# Patient Record
Sex: MALE | Race: WHITE | NOT HISPANIC OR LATINO
[De-identification: names, ages, dates, MRNs, and addresses within clinical notes are randomized per-mention and may not be internally consistent; named-entity substitution may affect disease eponyms.]

---

## 2017-03-08 ENCOUNTER — MEDICATION RENEWAL (OUTPATIENT)
Age: 47
End: 2017-03-08

## 2017-04-26 ENCOUNTER — APPOINTMENT (OUTPATIENT)
Dept: HEART AND VASCULAR | Facility: CLINIC | Age: 47
End: 2017-04-26

## 2017-04-26 VITALS
HEART RATE: 76 BPM | DIASTOLIC BLOOD PRESSURE: 92 MMHG | SYSTOLIC BLOOD PRESSURE: 140 MMHG | WEIGHT: 228 LBS | BODY MASS INDEX: 27.75 KG/M2

## 2017-06-09 ENCOUNTER — MEDICATION RENEWAL (OUTPATIENT)
Age: 47
End: 2017-06-09

## 2017-06-14 ENCOUNTER — APPOINTMENT (OUTPATIENT)
Dept: HEART AND VASCULAR | Facility: CLINIC | Age: 47
End: 2017-06-14

## 2017-06-14 VITALS
SYSTOLIC BLOOD PRESSURE: 104 MMHG | HEART RATE: 88 BPM | DIASTOLIC BLOOD PRESSURE: 60 MMHG | WEIGHT: 215 LBS | BODY MASS INDEX: 26.17 KG/M2

## 2017-06-14 DIAGNOSIS — R00.2 PALPITATIONS: ICD-10-CM

## 2017-08-02 ENCOUNTER — APPOINTMENT (OUTPATIENT)
Dept: HEART AND VASCULAR | Facility: CLINIC | Age: 47
End: 2017-08-02
Payer: COMMERCIAL

## 2017-08-02 VITALS — DIASTOLIC BLOOD PRESSURE: 92 MMHG | HEART RATE: 84 BPM | SYSTOLIC BLOOD PRESSURE: 150 MMHG

## 2017-08-02 DIAGNOSIS — F17.200 NICOTINE DEPENDENCE, UNSPECIFIED, UNCOMPLICATED: ICD-10-CM

## 2017-08-02 PROCEDURE — 99213 OFFICE O/P EST LOW 20 MIN: CPT | Mod: 25

## 2017-08-02 RX ORDER — AZITHROMYCIN 250 MG/1
250 TABLET, FILM COATED ORAL
Qty: 6 | Refills: 0 | Status: DISCONTINUED | COMMUNITY
Start: 2017-07-20

## 2017-08-02 RX ORDER — GUAIFENESIN AND CODEINE PHOSPHATE 10; 100 MG/5ML; MG/5ML
100-10 SOLUTION ORAL
Qty: 200 | Refills: 0 | Status: DISCONTINUED | COMMUNITY
Start: 2017-07-22

## 2017-10-25 ENCOUNTER — APPOINTMENT (OUTPATIENT)
Dept: HEART AND VASCULAR | Facility: CLINIC | Age: 47
End: 2017-10-25
Payer: COMMERCIAL

## 2017-10-25 VITALS
SYSTOLIC BLOOD PRESSURE: 124 MMHG | HEART RATE: 60 BPM | WEIGHT: 210 LBS | DIASTOLIC BLOOD PRESSURE: 78 MMHG | BODY MASS INDEX: 25.56 KG/M2

## 2017-10-25 DIAGNOSIS — I49.1 ATRIAL PREMATURE DEPOLARIZATION: ICD-10-CM

## 2017-10-25 PROCEDURE — 93000 ELECTROCARDIOGRAM COMPLETE: CPT

## 2017-10-25 PROCEDURE — 99214 OFFICE O/P EST MOD 30 MIN: CPT | Mod: 25

## 2017-10-25 RX ORDER — AZITHROMYCIN 500 MG/1
500 TABLET, FILM COATED ORAL
Qty: 10 | Refills: 0 | Status: DISCONTINUED | COMMUNITY
Start: 2017-07-22 | End: 2017-10-25

## 2017-10-25 RX ORDER — VALSARTAN AND HYDROCHLOROTHIAZIDE 80; 12.5 MG/1; MG/1
80-12.5 TABLET, FILM COATED ORAL
Qty: 60 | Refills: 0 | Status: DISCONTINUED | COMMUNITY
Start: 2017-06-09

## 2018-01-10 ENCOUNTER — APPOINTMENT (OUTPATIENT)
Dept: HEART AND VASCULAR | Facility: CLINIC | Age: 48
End: 2018-01-10
Payer: COMMERCIAL

## 2018-01-10 VITALS
WEIGHT: 215 LBS | SYSTOLIC BLOOD PRESSURE: 130 MMHG | BODY MASS INDEX: 26.17 KG/M2 | DIASTOLIC BLOOD PRESSURE: 82 MMHG | HEART RATE: 64 BPM

## 2018-01-10 PROCEDURE — 99214 OFFICE O/P EST MOD 30 MIN: CPT

## 2018-07-06 ENCOUNTER — MEDICATION RENEWAL (OUTPATIENT)
Age: 48
End: 2018-07-06

## 2018-07-25 ENCOUNTER — APPOINTMENT (OUTPATIENT)
Dept: HEART AND VASCULAR | Facility: CLINIC | Age: 48
End: 2018-07-25
Payer: COMMERCIAL

## 2018-07-25 VITALS — SYSTOLIC BLOOD PRESSURE: 110 MMHG | WEIGHT: 210 LBS | BODY MASS INDEX: 25.56 KG/M2 | DIASTOLIC BLOOD PRESSURE: 94 MMHG

## 2018-07-25 VITALS — HEART RATE: 92 BPM

## 2018-07-25 PROCEDURE — 99214 OFFICE O/P EST MOD 30 MIN: CPT | Mod: 25

## 2018-07-25 PROCEDURE — 93000 ELECTROCARDIOGRAM COMPLETE: CPT

## 2018-09-05 ENCOUNTER — APPOINTMENT (OUTPATIENT)
Dept: HEART AND VASCULAR | Facility: CLINIC | Age: 48
End: 2018-09-05
Payer: COMMERCIAL

## 2018-09-05 VITALS
HEART RATE: 68 BPM | WEIGHT: 215 LBS | SYSTOLIC BLOOD PRESSURE: 120 MMHG | BODY MASS INDEX: 26.17 KG/M2 | DIASTOLIC BLOOD PRESSURE: 82 MMHG

## 2018-09-05 PROCEDURE — 93000 ELECTROCARDIOGRAM COMPLETE: CPT

## 2018-09-05 PROCEDURE — 99213 OFFICE O/P EST LOW 20 MIN: CPT | Mod: 25

## 2018-09-05 RX ORDER — TELMISARTAN AND HYDROCHLOROTHIAZIDE 40; 12.5 MG/1; MG/1
40-12.5 TABLET ORAL DAILY
Qty: 30 | Refills: 2 | Status: DISCONTINUED | COMMUNITY
Start: 2018-07-25 | End: 2018-09-05

## 2018-12-19 ENCOUNTER — APPOINTMENT (OUTPATIENT)
Dept: HEART AND VASCULAR | Facility: CLINIC | Age: 48
End: 2018-12-19
Payer: COMMERCIAL

## 2018-12-19 VITALS
SYSTOLIC BLOOD PRESSURE: 132 MMHG | DIASTOLIC BLOOD PRESSURE: 90 MMHG | WEIGHT: 223 LBS | HEART RATE: 72 BPM | BODY MASS INDEX: 27.14 KG/M2

## 2018-12-19 PROCEDURE — 93000 ELECTROCARDIOGRAM COMPLETE: CPT

## 2018-12-19 PROCEDURE — 99214 OFFICE O/P EST MOD 30 MIN: CPT | Mod: 25

## 2018-12-19 NOTE — REVIEW OF SYSTEMS
[Fever] : no fever [Headache] : no headache [Chills] : no chills [Feeling Fatigued] : not feeling fatigued [Recent Weight Loss (___ Lbs)] : no recent weight loss [Hematuria] : no hematuria [Nocturia] : nocturia [Confusion] : no confusion was observed [Memory Lapses Or Loss] : no memory lapses or loss [Depression] : no depression [Anxiety] : no anxiety [Under Stress] : under stress [Suicidal] : not suicidal [Negative] : Heme/Lymph

## 2018-12-19 NOTE — PHYSICAL EXAM
[General Appearance - Well Developed] : well developed [Normal Appearance] : normal appearance [Well Groomed] : well groomed [General Appearance - Well Nourished] : well nourished [No Deformities] : no deformities [General Appearance - In No Acute Distress] : no acute distress [Normal Conjunctiva] : the conjunctiva exhibited no abnormalities [Normal Oral Mucosa] : normal oral mucosa [Normal Jugular Venous A Waves Present] : normal jugular venous A waves present [Normal Jugular Venous V Waves Present] : normal jugular venous V waves present [No Jugular Venous Opnce A Waves] : no jugular venous ponce A waves [FreeTextEntry1] : carotids w/o bruits [] : no respiratory distress [Respiration, Rhythm And Depth] : normal respiratory rhythm and effort [Exaggerated Use Of Accessory Muscles For Inspiration] : no accessory muscle use [Auscultation Breath Sounds / Voice Sounds] : lungs were clear to auscultation bilaterally [Heart Rate And Rhythm] : heart rate and rhythm were normal [Heart Sounds] : normal S1 and S2 [Murmurs] : no murmurs present [Edema] : no peripheral edema present [Bowel Sounds] : normal bowel sounds [Abdomen Soft] : soft [Abdomen Tenderness] : non-tender [Abnormal Walk] : normal gait [Nail Clubbing] : no clubbing of the fingernails [Skin Color & Pigmentation] : normal skin color and pigmentation [Oriented To Time, Place, And Person] : oriented to person, place, and time

## 2018-12-19 NOTE — DISCUSSION/SUMMARY
[FreeTextEntry1] : EKG:NSR,LAE\par stop smoking!\par bp high will increase Diovan HCT, cont Zocor for lipids\par reviewed labs done earlier this month\par LDL 87mg%

## 2018-12-19 NOTE — HISTORY OF PRESENT ILLNESS
[FreeTextEntry1] : no cp,sob,palpitations. exercising- states his bottom bp is running high. still smoking

## 2019-03-06 ENCOUNTER — APPOINTMENT (OUTPATIENT)
Dept: HEART AND VASCULAR | Facility: CLINIC | Age: 49
End: 2019-03-06
Payer: COMMERCIAL

## 2019-03-06 VITALS
WEIGHT: 225 LBS | BODY MASS INDEX: 27.39 KG/M2 | HEART RATE: 88 BPM | DIASTOLIC BLOOD PRESSURE: 86 MMHG | SYSTOLIC BLOOD PRESSURE: 128 MMHG

## 2019-03-06 PROCEDURE — 99214 OFFICE O/P EST MOD 30 MIN: CPT | Mod: 25

## 2019-03-06 PROCEDURE — 93000 ELECTROCARDIOGRAM COMPLETE: CPT

## 2019-03-06 NOTE — PHYSICAL EXAM
[General Appearance - Well Developed] : well developed [Normal Appearance] : normal appearance [Well Groomed] : well groomed [General Appearance - Well Nourished] : well nourished [No Deformities] : no deformities [General Appearance - In No Acute Distress] : no acute distress [Normal Conjunctiva] : the conjunctiva exhibited no abnormalities [Normal Oral Mucosa] : normal oral mucosa [Normal Jugular Venous A Waves Present] : normal jugular venous A waves present [Normal Jugular Venous V Waves Present] : normal jugular venous V waves present [No Jugular Venous Ponce A Waves] : no jugular venous ponce A waves [FreeTextEntry1] : carotids w/o bruits [] : no respiratory distress [Respiration, Rhythm And Depth] : normal respiratory rhythm and effort [Exaggerated Use Of Accessory Muscles For Inspiration] : no accessory muscle use [Auscultation Breath Sounds / Voice Sounds] : lungs were clear to auscultation bilaterally [Heart Rate And Rhythm] : heart rate and rhythm were normal [Heart Sounds] : normal S1 and S2 [Murmurs] : no murmurs present [Edema] : no peripheral edema present [Bowel Sounds] : normal bowel sounds [Abdomen Soft] : soft [Abdomen Tenderness] : non-tender [Abnormal Walk] : normal gait [Nail Clubbing] : no clubbing of the fingernails [Skin Color & Pigmentation] : normal skin color and pigmentation [Oriented To Time, Place, And Person] : oriented to person, place, and time

## 2019-03-06 NOTE — DISCUSSION/SUMMARY
[FreeTextEntry1] : EKG:NSR\par BP needs to be better controlled- will increase to Diovan HCT to 160/25 and check labs, continue Zocor for lipids

## 2019-03-06 NOTE — REVIEW OF SYSTEMS
[Fever] : no fever [Headache] : no headache [Chills] : no chills [Feeling Fatigued] : not feeling fatigued [Recent Weight Loss (___ Lbs)] : no recent weight loss [Hematuria] : no hematuria [Nocturia] : no nocturia [Confusion] : no confusion was observed [Memory Lapses Or Loss] : no memory lapses or loss [Depression] : no depression [Anxiety] : no anxiety [Under Stress] : under stress [Suicidal] : not suicidal [Negative] : Heme/Lymph

## 2019-03-27 ENCOUNTER — APPOINTMENT (OUTPATIENT)
Dept: HEART AND VASCULAR | Facility: CLINIC | Age: 49
End: 2019-03-27
Payer: COMMERCIAL

## 2019-03-27 VITALS — SYSTOLIC BLOOD PRESSURE: 120 MMHG | HEART RATE: 84 BPM | DIASTOLIC BLOOD PRESSURE: 70 MMHG

## 2019-03-27 PROCEDURE — 99213 OFFICE O/P EST LOW 20 MIN: CPT

## 2019-03-27 RX ORDER — HYDROCHLOROTHIAZIDE 12.5 MG/1
12.5 TABLET ORAL DAILY
Qty: 30 | Refills: 0 | Status: DISCONTINUED | COMMUNITY
Start: 2019-03-06 | End: 2019-03-27

## 2019-03-27 NOTE — PHYSICAL EXAM
[General Appearance - Well Developed] : well developed [Normal Appearance] : normal appearance [Well Groomed] : well groomed [General Appearance - Well Nourished] : well nourished [No Deformities] : no deformities [General Appearance - In No Acute Distress] : no acute distress [Normal Conjunctiva] : the conjunctiva exhibited no abnormalities [Normal Jugular Venous A Waves Present] : normal jugular venous A waves present [Normal Jugular Venous V Waves Present] : normal jugular venous V waves present [No Jugular Venous Ponce A Waves] : no jugular venous ponce A waves [FreeTextEntry1] : carotids w/o bruits [] : no respiratory distress [Respiration, Rhythm And Depth] : normal respiratory rhythm and effort [Exaggerated Use Of Accessory Muscles For Inspiration] : no accessory muscle use [Auscultation Breath Sounds / Voice Sounds] : lungs were clear to auscultation bilaterally [Heart Rate And Rhythm] : heart rate and rhythm were normal [Heart Sounds] : normal S1 and S2 [Murmurs] : no murmurs present [Edema] : no peripheral edema present [Abdomen Soft] : soft [Abnormal Walk] : normal gait [Skin Color & Pigmentation] : normal skin color and pigmentation [Oriented To Time, Place, And Person] : oriented to person, place, and time

## 2019-06-26 ENCOUNTER — APPOINTMENT (OUTPATIENT)
Dept: HEART AND VASCULAR | Facility: CLINIC | Age: 49
End: 2019-06-26
Payer: COMMERCIAL

## 2019-06-26 VITALS
WEIGHT: 215 LBS | DIASTOLIC BLOOD PRESSURE: 82 MMHG | BODY MASS INDEX: 26.17 KG/M2 | SYSTOLIC BLOOD PRESSURE: 124 MMHG | HEART RATE: 76 BPM

## 2019-06-26 PROCEDURE — 93000 ELECTROCARDIOGRAM COMPLETE: CPT

## 2019-06-26 PROCEDURE — 99214 OFFICE O/P EST MOD 30 MIN: CPT | Mod: 25

## 2019-06-26 RX ORDER — SIMVASTATIN 20 MG/1
20 TABLET, FILM COATED ORAL
Refills: 0 | Status: DISCONTINUED | COMMUNITY
End: 2018-11-26

## 2019-06-26 NOTE — DISCUSSION/SUMMARY
[FreeTextEntry1] : EKG:NSR,WNL\par needs labs for lipids.\par stop smoking!\par cont Diovan HCT for hptn\par med renewed

## 2019-06-26 NOTE — REVIEW OF SYSTEMS
[Fever] : no fever [Headache] : no headache [Chills] : no chills [Feeling Fatigued] : not feeling fatigued [Recent Weight Loss (___ Lbs)] : no recent weight loss [Hematuria] : no hematuria [Nocturia] : no nocturia [Confusion] : no confusion was observed [Memory Lapses Or Loss] : no memory lapses or loss [Depression] : no depression [Anxiety] : no anxiety [Under Stress] : not under stress [Suicidal] : not suicidal [Negative] : Heme/Lymph

## 2019-06-26 NOTE — HISTORY OF PRESENT ILLNESS
[FreeTextEntry1] : no cp,sob,palpitations\par has been off Zocor for several months\par exercising regularly\par still smoking( < 1ppd)

## 2019-11-13 ENCOUNTER — APPOINTMENT (OUTPATIENT)
Dept: HEART AND VASCULAR | Facility: CLINIC | Age: 49
End: 2019-11-13
Payer: COMMERCIAL

## 2019-11-13 VITALS
DIASTOLIC BLOOD PRESSURE: 90 MMHG | HEART RATE: 68 BPM | WEIGHT: 220 LBS | HEIGHT: 76 IN | SYSTOLIC BLOOD PRESSURE: 130 MMHG | BODY MASS INDEX: 26.79 KG/M2

## 2019-11-13 PROCEDURE — 93000 ELECTROCARDIOGRAM COMPLETE: CPT

## 2019-11-13 PROCEDURE — 99214 OFFICE O/P EST MOD 30 MIN: CPT | Mod: 25

## 2019-11-13 NOTE — PHYSICAL EXAM
[General Appearance - Well Developed] : well developed [Normal Appearance] : normal appearance [Well Groomed] : well groomed [General Appearance - Well Nourished] : well nourished [No Deformities] : no deformities [Normal Conjunctiva] : the conjunctiva exhibited no abnormalities [General Appearance - In No Acute Distress] : no acute distress [Normal Oral Mucosa] : normal oral mucosa [Normal Jugular Venous A Waves Present] : normal jugular venous A waves present [Normal Jugular Venous V Waves Present] : normal jugular venous V waves present [No Jugular Venous Ponce A Waves] : no jugular venous ponce A waves [FreeTextEntry1] : carotids w/o bruits [] : no respiratory distress [Respiration, Rhythm And Depth] : normal respiratory rhythm and effort [Exaggerated Use Of Accessory Muscles For Inspiration] : no accessory muscle use [Auscultation Breath Sounds / Voice Sounds] : lungs were clear to auscultation bilaterally [Heart Rate And Rhythm] : heart rate and rhythm were normal [Heart Sounds] : normal S1 and S2 [Edema] : no peripheral edema present [Murmurs] : no murmurs present [Bowel Sounds] : normal bowel sounds [Abdomen Tenderness] : non-tender [Abdomen Soft] : soft [Abnormal Walk] : normal gait [Nail Clubbing] : no clubbing of the fingernails [Skin Color & Pigmentation] : normal skin color and pigmentation [Oriented To Time, Place, And Person] : oriented to person, place, and time

## 2019-11-13 NOTE — DISCUSSION/SUMMARY
[FreeTextEntry1] : EKG:NSR\par BP elevated but was on OTC cold meds- f/u BP with pcp and cont diovan \par diet for lipids- stop smoking\par meds renewed

## 2019-11-13 NOTE — REVIEW OF SYSTEMS
[see HPI] : see HPI [Confusion] : no confusion was observed [Cough] : cough [Memory Lapses Or Loss] : no memory lapses or loss [Depression] : no depression [Anxiety] : no anxiety [Under Stress] : under stress [Negative] : Heme/Lymph

## 2020-02-05 ENCOUNTER — APPOINTMENT (OUTPATIENT)
Dept: HEART AND VASCULAR | Facility: CLINIC | Age: 50
End: 2020-02-05
Payer: COMMERCIAL

## 2020-02-05 VITALS
DIASTOLIC BLOOD PRESSURE: 86 MMHG | WEIGHT: 235 LBS | HEART RATE: 72 BPM | BODY MASS INDEX: 28.61 KG/M2 | SYSTOLIC BLOOD PRESSURE: 130 MMHG

## 2020-02-05 PROCEDURE — 99214 OFFICE O/P EST MOD 30 MIN: CPT | Mod: 25

## 2020-02-05 PROCEDURE — 93000 ELECTROCARDIOGRAM COMPLETE: CPT

## 2020-02-05 PROCEDURE — 93306 TTE W/DOPPLER COMPLETE: CPT

## 2020-02-05 NOTE — REVIEW OF SYSTEMS
[see HPI] : see HPI [Recent Weight Gain (___ Lbs)] : recent [unfilled] ~Ulb weight gain [Under Stress] : under stress [Negative] : Heme/Lymph [Fever] : no fever [Headache] : no headache [Chills] : no chills [Feeling Fatigued] : not feeling fatigued [Cough] : no cough [Nocturia] : no nocturia [Confusion] : no confusion was observed [Memory Lapses Or Loss] : no memory lapses or loss [Depression] : no depression [Anxiety] : no anxiety

## 2020-02-05 NOTE — PHYSICAL EXAM
[General Appearance - Well Developed] : well developed [Normal Appearance] : normal appearance [Well Groomed] : well groomed [General Appearance - Well Nourished] : well nourished [No Deformities] : no deformities [General Appearance - In No Acute Distress] : no acute distress [Normal Conjunctiva] : the conjunctiva exhibited no abnormalities [Normal Oral Mucosa] : normal oral mucosa [Normal Jugular Venous A Waves Present] : normal jugular venous A waves present [Normal Jugular Venous V Waves Present] : normal jugular venous V waves present [No Jugular Venous Ponce A Waves] : no jugular venous ponce A waves [Respiration, Rhythm And Depth] : normal respiratory rhythm and effort [] : no respiratory distress [Exaggerated Use Of Accessory Muscles For Inspiration] : no accessory muscle use [Auscultation Breath Sounds / Voice Sounds] : lungs were clear to auscultation bilaterally [Heart Rate And Rhythm] : heart rate and rhythm were normal [Murmurs] : no murmurs present [Heart Sounds] : normal S1 and S2 [Edema] : no peripheral edema present [Bowel Sounds] : normal bowel sounds [Abdomen Soft] : soft [Abdomen Tenderness] : non-tender [Nail Clubbing] : no clubbing of the fingernails [Abnormal Walk] : normal gait [Skin Color & Pigmentation] : normal skin color and pigmentation [Oriented To Time, Place, And Person] : oriented to person, place, and time [FreeTextEntry1] : carotids w/o bruits

## 2020-02-05 NOTE — DISCUSSION/SUMMARY
[FreeTextEntry1] : EKG:NSR\par ECHO:minimal AV thickening- mild AS but visually opens well, LVH\par BP elevated probably due to weight- weight loss and cont Diovan for hptn\par MED RENEWED

## 2020-05-06 ENCOUNTER — APPOINTMENT (OUTPATIENT)
Dept: HEART AND VASCULAR | Facility: CLINIC | Age: 50
End: 2020-05-06
Payer: COMMERCIAL

## 2020-05-06 VITALS
HEART RATE: 68 BPM | WEIGHT: 248 LBS | DIASTOLIC BLOOD PRESSURE: 90 MMHG | SYSTOLIC BLOOD PRESSURE: 132 MMHG | BODY MASS INDEX: 30.19 KG/M2

## 2020-05-06 DIAGNOSIS — I10 ESSENTIAL (PRIMARY) HYPERTENSION: ICD-10-CM

## 2020-05-06 PROCEDURE — 99214 OFFICE O/P EST MOD 30 MIN: CPT | Mod: 25

## 2020-05-06 PROCEDURE — 93000 ELECTROCARDIOGRAM COMPLETE: CPT

## 2020-05-06 RX ORDER — IBUPROFEN 800 MG/1
800 TABLET, FILM COATED ORAL
Refills: 0 | Status: DISCONTINUED | COMMUNITY
End: 2020-05-06

## 2020-05-06 NOTE — DISCUSSION/SUMMARY
[FreeTextEntry1] : EKG:NSR\par BP elevated/ ? NSAIDs/weight- would increase meds but wants to try weight loss/ stop NSAID and lose weight\par cont Diovan for hptn \par stop smoking- get labs- see orthopedist for sciatica\par med renewed

## 2020-05-06 NOTE — REVIEW OF SYSTEMS
[Fever] : no fever [Headache] : no headache [Recent Weight Gain (___ Lbs)] : recent [unfilled] ~Ulb weight gain [Chills] : no chills [Feeling Fatigued] : not feeling fatigued [Cough] : no cough [Nocturia] : no nocturia [see HPI] : see HPI [Joint Pain] : joint pain [Lower Leg Pain] : leg pain [Lower Back Pain] : lower back pain [Confusion] : no confusion was observed [Memory Lapses Or Loss] : no memory lapses or loss [Depression] : no depression [Anxiety] : anxiety [Under Stress] : under stress [Negative] : Heme/Lymph

## 2020-08-12 ENCOUNTER — APPOINTMENT (OUTPATIENT)
Dept: HEART AND VASCULAR | Facility: CLINIC | Age: 50
End: 2020-08-12
Payer: COMMERCIAL

## 2020-08-12 VITALS
DIASTOLIC BLOOD PRESSURE: 86 MMHG | WEIGHT: 229 LBS | BODY MASS INDEX: 27.87 KG/M2 | SYSTOLIC BLOOD PRESSURE: 150 MMHG | HEART RATE: 68 BPM

## 2020-08-12 VITALS — DIASTOLIC BLOOD PRESSURE: 84 MMHG | SYSTOLIC BLOOD PRESSURE: 142 MMHG

## 2020-08-12 PROCEDURE — 99214 OFFICE O/P EST MOD 30 MIN: CPT | Mod: 25

## 2020-08-12 PROCEDURE — 93000 ELECTROCARDIOGRAM COMPLETE: CPT

## 2020-08-12 NOTE — DISCUSSION/SUMMARY
[FreeTextEntry1] : EKG:NSR\par BP high but he states usually good-? due to lack of sleep- he will be seeing PCP in 4-6 weeks and get labs and BP checked- cont Diovan HCT for hPTN;diet for lipids\par reviewed labs done May;\par SFK=526hm%-diet for lipids\par med renewed

## 2020-08-12 NOTE — REVIEW OF SYSTEMS
[Fever] : no fever [Headache] : no headache [Recent Weight Gain (___ Lbs)] : no recent weight gain [Chills] : no chills [Feeling Fatigued] : not feeling fatigued [Cough] : no cough [Recent Weight Loss (___ Lbs)] : recent [unfilled] ~Ulb weight loss [Nocturia] : no nocturia [see HPI] : see HPI [Joint Pain] : joint pain [Lower Leg Pain] : leg pain [Lower Back Pain] : lower back pain [Confusion] : no confusion was observed [Memory Lapses Or Loss] : no memory lapses or loss [Depression] : no depression [Anxiety] : no anxiety [Under Stress] : under stress [Negative] : Heme/Lymph

## 2020-08-12 NOTE — PHYSICAL EXAM
[Normal Appearance] : normal appearance [General Appearance - Well Developed] : well developed [Well Groomed] : well groomed [No Deformities] : no deformities [General Appearance - Well Nourished] : well nourished [Normal Oral Mucosa] : normal oral mucosa [Normal Conjunctiva] : the conjunctiva exhibited no abnormalities [General Appearance - In No Acute Distress] : no acute distress [No Jugular Venous Ponce A Waves] : no jugular venous ponce A waves [Normal Jugular Venous A Waves Present] : normal jugular venous A waves present [Normal Jugular Venous V Waves Present] : normal jugular venous V waves present [FreeTextEntry1] : carotids w/o bruits [] : no respiratory distress [Respiration, Rhythm And Depth] : normal respiratory rhythm and effort [Auscultation Breath Sounds / Voice Sounds] : lungs were clear to auscultation bilaterally [Heart Rate And Rhythm] : heart rate and rhythm were normal [Exaggerated Use Of Accessory Muscles For Inspiration] : no accessory muscle use [Murmurs] : no murmurs present [Heart Sounds] : normal S1 and S2 [Edema] : no peripheral edema present [Bowel Sounds] : normal bowel sounds [Abdomen Soft] : soft [Abdomen Tenderness] : non-tender [Abnormal Walk] : normal gait [Skin Color & Pigmentation] : normal skin color and pigmentation [Nail Clubbing] : no clubbing of the fingernails [Oriented To Time, Place, And Person] : oriented to person, place, and time

## 2021-02-24 ENCOUNTER — APPOINTMENT (OUTPATIENT)
Dept: HEART AND VASCULAR | Facility: CLINIC | Age: 51
End: 2021-02-24
Payer: COMMERCIAL

## 2021-02-24 VITALS
HEART RATE: 88 BPM | DIASTOLIC BLOOD PRESSURE: 78 MMHG | WEIGHT: 225 LBS | SYSTOLIC BLOOD PRESSURE: 128 MMHG | BODY MASS INDEX: 27.39 KG/M2

## 2021-02-24 PROCEDURE — 99406 BEHAV CHNG SMOKING 3-10 MIN: CPT

## 2021-02-24 PROCEDURE — 99072 ADDL SUPL MATRL&STAF TM PHE: CPT

## 2021-02-24 PROCEDURE — 99214 OFFICE O/P EST MOD 30 MIN: CPT | Mod: 25

## 2021-02-24 PROCEDURE — 93000 ELECTROCARDIOGRAM COMPLETE: CPT

## 2021-02-24 NOTE — HISTORY OF PRESENT ILLNESS
[FreeTextEntry1] : feels well- hasn't been at [facility] because of COVIDS but exercising- no cp,sob- still smoking

## 2021-02-24 NOTE — REVIEW OF SYSTEMS
[Fever] : no fever [Headache] : no headache [Recent Weight Gain (___ Lbs)] : no recent weight gain [Chills] : no chills [Feeling Fatigued] : not feeling fatigued [Recent Weight Loss (___ Lbs)] : recent [unfilled] ~Ulb weight loss [Cough] : no cough [Nocturia] : no nocturia [Joint Pain] : no joint pain [Confusion] : no confusion was observed [Memory Lapses Or Loss] : no memory lapses or loss [Depression] : no depression [Anxiety] : no anxiety [Under Stress] : under stress [Negative] : Heme/Lymph

## 2021-02-24 NOTE — DISCUSSION/SUMMARY
[FreeTextEntry1] : EKG:NSR,ST abn\par discussed need to stop smoking- reviewed labs from may- LDL high- due for physical soon- cont Diovan for HPTN(refilled)-diet/weight loss for Lipids

## 2021-03-12 ENCOUNTER — APPOINTMENT (OUTPATIENT)
Dept: HEART AND VASCULAR | Facility: CLINIC | Age: 51
End: 2021-03-12
Payer: COMMERCIAL

## 2021-03-12 DIAGNOSIS — I51.7 CARDIOMEGALY: ICD-10-CM

## 2021-03-12 PROCEDURE — 99072 ADDL SUPL MATRL&STAF TM PHE: CPT

## 2021-03-12 PROCEDURE — 93306 TTE W/DOPPLER COMPLETE: CPT

## 2021-06-23 ENCOUNTER — APPOINTMENT (OUTPATIENT)
Dept: HEART AND VASCULAR | Facility: CLINIC | Age: 51
End: 2021-06-23
Payer: COMMERCIAL

## 2021-06-23 VITALS
SYSTOLIC BLOOD PRESSURE: 140 MMHG | WEIGHT: 219 LBS | BODY MASS INDEX: 26.66 KG/M2 | HEART RATE: 72 BPM | DIASTOLIC BLOOD PRESSURE: 88 MMHG

## 2021-06-23 PROCEDURE — 93000 ELECTROCARDIOGRAM COMPLETE: CPT

## 2021-06-23 PROCEDURE — 99215 OFFICE O/P EST HI 40 MIN: CPT | Mod: 25

## 2021-06-23 PROCEDURE — 99072 ADDL SUPL MATRL&STAF TM PHE: CPT

## 2021-06-23 PROCEDURE — 99406 BEHAV CHNG SMOKING 3-10 MIN: CPT

## 2021-06-23 RX ORDER — ASPIRIN 81 MG/1
81 TABLET ORAL
Refills: 0 | Status: DISCONTINUED | COMMUNITY
End: 2021-06-23

## 2021-06-23 NOTE — DISCUSSION/SUMMARY
[FreeTextEntry1] : EKG:NSR\par Reviewed labs done March; DVK=090ec%,IE=076xd%\par needs to stop smoking. Bp not at goal- but he hasn't been exercising and stress/lack of sleep ( BP usually good)- cont present dose of Diovan HCT, exercise and recheck BP. discussed better diet/exercise for lipids- RV 3 months for labs and if LDL still high will start statin. med renewed

## 2021-06-23 NOTE — HISTORY OF PRESENT ILLNESS
[FreeTextEntry1] : abraham lynchng weight- no cp/sob. hasn't slept past few days(MIL ). still smoking

## 2021-06-23 NOTE — REVIEW OF SYSTEMS
[Weight Loss (___ Lbs)] : [unfilled] ~Ulb weight loss [Negative] : Heme/Lymph [Hematuria] : no hematuria [Nocturia] : no nocturia

## 2021-06-23 NOTE — PHYSICAL EXAM
[Well Developed] : well developed [Well Nourished] : well nourished [No Acute Distress] : no acute distress [Normal Conjunctiva] : normal conjunctiva [Normal Venous Pressure] : normal venous pressure [No Carotid Bruit] : no carotid bruit [Normal S1, S2] : normal S1, S2 [No Rub] : no rub [No Gallop] : no gallop [Clear Lung Fields] : clear lung fields [Good Air Entry] : good air entry [No Respiratory Distress] : no respiratory distress  [Soft] : abdomen soft [Non Tender] : non-tender [Normal Bowel Sounds] : normal bowel sounds [Normal Gait] : normal gait [No Edema] : no edema [No Cyanosis] : no cyanosis [No Clubbing] : no clubbing [No Rash] : no rash [Moves all extremities] : moves all extremities [Normal Speech] : normal speech [Alert and Oriented] : alert and oriented [Normal memory] : normal memory [de-identified] : soft systolic murmur

## 2021-12-15 ENCOUNTER — RX RENEWAL (OUTPATIENT)
Age: 51
End: 2021-12-15

## 2022-01-05 ENCOUNTER — NON-APPOINTMENT (OUTPATIENT)
Age: 52
End: 2022-01-05

## 2022-01-05 ENCOUNTER — APPOINTMENT (OUTPATIENT)
Dept: HEART AND VASCULAR | Facility: CLINIC | Age: 52
End: 2022-01-05
Payer: COMMERCIAL

## 2022-01-05 VITALS
HEART RATE: 76 BPM | BODY MASS INDEX: 24.83 KG/M2 | DIASTOLIC BLOOD PRESSURE: 70 MMHG | SYSTOLIC BLOOD PRESSURE: 130 MMHG | WEIGHT: 204 LBS

## 2022-01-05 PROCEDURE — 93000 ELECTROCARDIOGRAM COMPLETE: CPT

## 2022-01-05 PROCEDURE — 99214 OFFICE O/P EST MOD 30 MIN: CPT | Mod: 25

## 2022-01-05 NOTE — DISCUSSION/SUMMARY
[FreeTextEntry1] : EKG:NSR\par reviewed last labs- LDL high- discussed diet and need for f/u labs\par cont Diovan for HPTN\par meds refilled- needs to stop smoking

## 2022-01-05 NOTE — PHYSICAL EXAM
[Well Developed] : well developed [Well Nourished] : well nourished [No Acute Distress] : no acute distress [Normal Conjunctiva] : normal conjunctiva [Normal Venous Pressure] : normal venous pressure [No Carotid Bruit] : no carotid bruit [Normal S1, S2] : normal S1, S2 [No Rub] : no rub [No Gallop] : no gallop [Clear Lung Fields] : clear lung fields [Good Air Entry] : good air entry [No Respiratory Distress] : no respiratory distress  [Soft] : abdomen soft [Non Tender] : non-tender [Normal Bowel Sounds] : normal bowel sounds [Normal Gait] : normal gait [No Edema] : no edema [No Cyanosis] : no cyanosis [No Clubbing] : no clubbing [No Rash] : no rash [Moves all extremities] : moves all extremities [Normal Speech] : normal speech [Alert and Oriented] : alert and oriented [Normal memory] : normal memory [de-identified] : soft systolic murmur

## 2022-01-05 NOTE — HISTORY OF PRESENT ILLNESS
[FreeTextEntry1] : no cp/sob\par still smoking Presents to ED for admission to treat DVT found in right leg.  Patient was seen yesterday in [facility] ED for right leg tenderness and "small bump" on calf as per request of his PMD to r/o DVT after recent trip to [place].  Patient reports history of hypercoagulable disorder which he is on eliquis daily.  After DVT study, patient signed out AMA due to inability to stay for admission last night.  He has returned this am for admission.  Denies any CP, palpitations, SOB, abd pain, numbness, tingling, weakness, fevers, chills.

## 2022-05-11 ENCOUNTER — APPOINTMENT (OUTPATIENT)
Dept: HEART AND VASCULAR | Facility: CLINIC | Age: 52
End: 2022-05-11
Payer: COMMERCIAL

## 2022-05-11 VITALS — SYSTOLIC BLOOD PRESSURE: 120 MMHG | DIASTOLIC BLOOD PRESSURE: 80 MMHG | HEART RATE: 80 BPM

## 2022-05-11 PROCEDURE — 99214 OFFICE O/P EST MOD 30 MIN: CPT | Mod: 25

## 2022-05-11 PROCEDURE — 93000 ELECTROCARDIOGRAM COMPLETE: CPT

## 2022-05-11 NOTE — DISCUSSION/SUMMARY
[FreeTextEntry1] : EKG:NSR\par reviewed labs done Mar 14\par QDA=431jj%,HDL=36mg%\par better diet and repeat labs for lipids\par cont Diovan for HPTN(refilled)

## 2022-05-11 NOTE — PHYSICAL EXAM
[Well Developed] : well developed [Well Nourished] : well nourished [No Acute Distress] : no acute distress [Normal Conjunctiva] : normal conjunctiva [Normal Venous Pressure] : normal venous pressure [No Carotid Bruit] : no carotid bruit [Normal S1, S2] : normal S1, S2 [No Rub] : no rub [No Gallop] : no gallop [Clear Lung Fields] : clear lung fields [Good Air Entry] : good air entry [No Respiratory Distress] : no respiratory distress  [Soft] : abdomen soft [Non Tender] : non-tender [Normal Bowel Sounds] : normal bowel sounds [Normal Gait] : normal gait [No Edema] : no edema [No Cyanosis] : no cyanosis [No Clubbing] : no clubbing [No Rash] : no rash [Moves all extremities] : moves all extremities [Normal Speech] : normal speech [Alert and Oriented] : alert and oriented [de-identified] : soft systolic murmur

## 2022-08-26 ENCOUNTER — APPOINTMENT (OUTPATIENT)
Dept: HEART AND VASCULAR | Facility: CLINIC | Age: 52
End: 2022-08-26

## 2022-08-26 VITALS — SYSTOLIC BLOOD PRESSURE: 120 MMHG | HEART RATE: 128 BPM | DIASTOLIC BLOOD PRESSURE: 70 MMHG

## 2022-08-26 DIAGNOSIS — I35.9 NONRHEUMATIC AORTIC VALVE DISORDER, UNSPECIFIED: ICD-10-CM

## 2022-08-26 PROCEDURE — 99214 OFFICE O/P EST MOD 30 MIN: CPT | Mod: 25

## 2022-08-26 PROCEDURE — 93000 ELECTROCARDIOGRAM COMPLETE: CPT

## 2022-08-26 PROCEDURE — 93306 TTE W/DOPPLER COMPLETE: CPT

## 2022-08-26 NOTE — DISCUSSION/SUMMARY
[FreeTextEntry1] : EKG:AF with RVR\par TTE:mild AS,LVH,normal LVEF,mild AR \par new AF - discussed this with him- add Toprol f +Xarelto( wants OD dosing) for rate control/AF\par continue valsartan for HPTN; diet for lipids\par needs to stop smoking- dsicussed not to take ASa/NSAIDs

## 2022-08-26 NOTE — PHYSICAL EXAM
[Well Developed] : well developed [Well Nourished] : well nourished [No Acute Distress] : no acute distress [Normal Conjunctiva] : normal conjunctiva [Normal Venous Pressure] : normal venous pressure [No Carotid Bruit] : no carotid bruit [No Rub] : no rub [No Gallop] : no gallop [Clear Lung Fields] : clear lung fields [Good Air Entry] : good air entry [No Respiratory Distress] : no respiratory distress  [Soft] : abdomen soft [Non Tender] : non-tender [Normal Bowel Sounds] : normal bowel sounds [Normal Gait] : normal gait [No Edema] : no edema [No Cyanosis] : no cyanosis [No Clubbing] : no clubbing [No Rash] : no rash [Moves all extremities] : moves all extremities [Normal Speech] : normal speech [Alert and Oriented] : alert and oriented [de-identified] : soft systolic murmur:irregular

## 2022-08-31 ENCOUNTER — APPOINTMENT (OUTPATIENT)
Dept: HEART AND VASCULAR | Facility: CLINIC | Age: 52
End: 2022-08-31

## 2022-08-31 VITALS — SYSTOLIC BLOOD PRESSURE: 110 MMHG | HEART RATE: 52 BPM | DIASTOLIC BLOOD PRESSURE: 70 MMHG

## 2022-08-31 PROCEDURE — 99214 OFFICE O/P EST MOD 30 MIN: CPT | Mod: 25

## 2022-08-31 PROCEDURE — 93000 ELECTROCARDIOGRAM COMPLETE: CPT

## 2022-08-31 NOTE — DISCUSSION/SUMMARY
[FreeTextEntry1] : EKG:SB\par reduce BB and cont xarelto for af- continue vaslartn for hptn\par discussed CHADS score of 1- i would favor continued anticoagulation- would get CCTA to r/o CAD\par diet for lipids- depending on calcium score may need statin

## 2022-08-31 NOTE — PHYSICAL EXAM
[Well Developed] : well developed [Well Nourished] : well nourished [No Acute Distress] : no acute distress [Normal Conjunctiva] : normal conjunctiva [Normal Venous Pressure] : normal venous pressure [No Carotid Bruit] : no carotid bruit [Normal S1, S2] : normal S1, S2 [No Rub] : no rub [No Gallop] : no gallop [Clear Lung Fields] : clear lung fields [Good Air Entry] : good air entry [No Respiratory Distress] : no respiratory distress  [Soft] : abdomen soft [Non Tender] : non-tender [Normal Gait] : normal gait [No Edema] : no edema [No Cyanosis] : no cyanosis [No Clubbing] : no clubbing [No Rash] : no rash [Moves all extremities] : moves all extremities [Normal Speech] : normal speech [Alert and Oriented] : alert and oriented [de-identified] : soft systolic murmur:

## 2022-11-30 ENCOUNTER — APPOINTMENT (OUTPATIENT)
Dept: HEART AND VASCULAR | Facility: CLINIC | Age: 52
End: 2022-11-30

## 2022-11-30 ENCOUNTER — NON-APPOINTMENT (OUTPATIENT)
Age: 52
End: 2022-11-30

## 2022-11-30 VITALS
TEMPERATURE: 98.7 F | HEART RATE: 69 BPM | OXYGEN SATURATION: 96 % | SYSTOLIC BLOOD PRESSURE: 128 MMHG | BODY MASS INDEX: 32.47 KG/M2 | WEIGHT: 253 LBS | DIASTOLIC BLOOD PRESSURE: 77 MMHG | HEIGHT: 74 IN

## 2022-11-30 PROCEDURE — 99214 OFFICE O/P EST MOD 30 MIN: CPT | Mod: 25

## 2022-11-30 PROCEDURE — 93000 ELECTROCARDIOGRAM COMPLETE: CPT

## 2022-11-30 RX ORDER — EFINACONAZOLE 100 MG/ML
10 SOLUTION TOPICAL
Qty: 4 | Refills: 0 | Status: DISCONTINUED | COMMUNITY
Start: 2022-03-26

## 2022-11-30 RX ORDER — CIPROFLOXACIN HYDROCHLORIDE 500 MG/1
500 TABLET, FILM COATED ORAL
Qty: 20 | Refills: 0 | Status: DISCONTINUED | COMMUNITY
Start: 2022-10-27

## 2022-11-30 RX ORDER — OXYCODONE AND ACETAMINOPHEN 5; 325 MG/1; MG/1
5-325 TABLET ORAL
Qty: 4 | Refills: 0 | Status: DISCONTINUED | COMMUNITY
Start: 2022-10-26

## 2022-11-30 RX ORDER — DIAZEPAM 10 MG/1
10 TABLET ORAL
Qty: 4 | Refills: 0 | Status: DISCONTINUED | COMMUNITY
Start: 2022-10-26

## 2022-11-30 RX ORDER — ONDANSETRON 8 MG/1
8 TABLET ORAL
Qty: 4 | Refills: 0 | Status: DISCONTINUED | COMMUNITY
Start: 2022-10-26

## 2022-11-30 NOTE — PHYSICAL EXAM
[Well Developed] : well developed [Well Nourished] : well nourished [No Acute Distress] : no acute distress [Normal Conjunctiva] : normal conjunctiva [Normal Venous Pressure] : normal venous pressure [No Carotid Bruit] : no carotid bruit [Normal S1, S2] : normal S1, S2 [No Rub] : no rub [No Gallop] : no gallop [Clear Lung Fields] : clear lung fields [Good Air Entry] : good air entry [No Respiratory Distress] : no respiratory distress  [Soft] : abdomen soft [Non Tender] : non-tender [Normal Gait] : normal gait [No Edema] : no edema [No Cyanosis] : no cyanosis [No Clubbing] : no clubbing [No Rash] : no rash [Moves all extremities] : moves all extremities [Normal Speech] : normal speech [Alert and Oriented] : alert and oriented [de-identified] : soft systolic murmur:

## 2022-11-30 NOTE — DISCUSSION/SUMMARY
[FreeTextEntry1] : EKG:NSR,PRWP(probably positional)\par continue toprol + Xarelto for PAF; Diovan HCT (refilled) for HPTN; diet for HLD- had recent labs -will get copy- advised to stop smoking\par RV for TTE

## 2022-12-14 ENCOUNTER — APPOINTMENT (OUTPATIENT)
Dept: CARDIOLOGY | Facility: CLINIC | Age: 52
End: 2022-12-14

## 2023-03-17 ENCOUNTER — NON-APPOINTMENT (OUTPATIENT)
Age: 53
End: 2023-03-17

## 2023-03-17 ENCOUNTER — APPOINTMENT (OUTPATIENT)
Dept: HEART AND VASCULAR | Facility: CLINIC | Age: 53
End: 2023-03-17
Payer: COMMERCIAL

## 2023-03-17 VITALS
HEIGHT: 74 IN | BODY MASS INDEX: 31.83 KG/M2 | SYSTOLIC BLOOD PRESSURE: 137 MMHG | TEMPERATURE: 98 F | WEIGHT: 248 LBS | OXYGEN SATURATION: 98 % | HEART RATE: 142 BPM | DIASTOLIC BLOOD PRESSURE: 94 MMHG

## 2023-03-17 VITALS — HEART RATE: 120 BPM

## 2023-03-17 PROCEDURE — 99214 OFFICE O/P EST MOD 30 MIN: CPT | Mod: 25

## 2023-03-17 PROCEDURE — 93000 ELECTROCARDIOGRAM COMPLETE: CPT

## 2023-03-17 NOTE — PHYSICAL EXAM
[Well Developed] : well developed [Well Nourished] : well nourished [No Acute Distress] : no acute distress [Normal Conjunctiva] : normal conjunctiva [Normal Venous Pressure] : normal venous pressure [No Carotid Bruit] : no carotid bruit [No Rub] : no rub [No Gallop] : no gallop [Clear Lung Fields] : clear lung fields [Good Air Entry] : good air entry [No Respiratory Distress] : no respiratory distress  [Soft] : abdomen soft [Non Tender] : non-tender [Normal Gait] : normal gait [No Edema] : no edema [No Cyanosis] : no cyanosis [No Clubbing] : no clubbing [No Rash] : no rash [Moves all extremities] : moves all extremities [Alert and Oriented] : alert and oriented [Normal Speech] : normal speech [de-identified] : irregular; soft systolic murmur:

## 2023-03-17 NOTE — DISCUSSION/SUMMARY
[FreeTextEntry1] : EKG:AF with RVR\par states went drinking last night after a wake- so feel has "holiday heart"- resume BB and Xarelto for AF; valsartan for HPTN\par diet for HLD\par needs to stop smoking\par RV 1 week

## 2023-03-29 ENCOUNTER — APPOINTMENT (OUTPATIENT)
Dept: CARDIOLOGY | Facility: CLINIC | Age: 53
End: 2023-03-29
Payer: COMMERCIAL

## 2023-03-29 PROCEDURE — 93306 TTE W/DOPPLER COMPLETE: CPT

## 2023-06-28 ENCOUNTER — APPOINTMENT (OUTPATIENT)
Dept: HEART AND VASCULAR | Facility: CLINIC | Age: 53
End: 2023-06-28
Payer: COMMERCIAL

## 2023-06-28 ENCOUNTER — NON-APPOINTMENT (OUTPATIENT)
Age: 53
End: 2023-06-28

## 2023-06-28 VITALS
DIASTOLIC BLOOD PRESSURE: 76 MMHG | TEMPERATURE: 97.9 F | BODY MASS INDEX: 31.08 KG/M2 | WEIGHT: 250 LBS | HEART RATE: 107 BPM | OXYGEN SATURATION: 96 % | SYSTOLIC BLOOD PRESSURE: 125 MMHG | HEIGHT: 75 IN

## 2023-06-28 DIAGNOSIS — I48.91 UNSPECIFIED ATRIAL FIBRILLATION: ICD-10-CM

## 2023-06-28 PROCEDURE — 93000 ELECTROCARDIOGRAM COMPLETE: CPT

## 2023-06-28 PROCEDURE — 99214 OFFICE O/P EST MOD 30 MIN: CPT | Mod: 25

## 2023-06-28 NOTE — HISTORY OF PRESENT ILLNESS
[FreeTextEntry1] : still smoking- at times ( when his weight is up) he feels less energetic\par HE IS NOT TAKING METOPROLOL NOR DOAC

## 2023-06-28 NOTE — PHYSICAL EXAM
[Well Developed] : well developed [Well Nourished] : well nourished [No Acute Distress] : no acute distress [Normal Conjunctiva] : normal conjunctiva [Normal Venous Pressure] : normal venous pressure [No Carotid Bruit] : no carotid bruit [No Rub] : no rub [No Gallop] : no gallop [Clear Lung Fields] : clear lung fields [Good Air Entry] : good air entry [No Respiratory Distress] : no respiratory distress  [Soft] : abdomen soft [Non Tender] : non-tender [Normal Gait] : normal gait [No Edema] : no edema [No Cyanosis] : no cyanosis [No Clubbing] : no clubbing [No Rash] : no rash [Moves all extremities] : moves all extremities [Normal Speech] : normal speech [Alert and Oriented] : alert and oriented [de-identified] : irregular; soft systolic murmur:

## 2023-06-28 NOTE — DISCUSSION/SUMMARY
[FreeTextEntry1] : EKG:AF\par enforced need for DOAC to reduce stroke risk and BB for rate control; Valsartan for HPTN - will reorder\par diet for HLD\par discussed DCC- he wants to defer for now\par meds refilled\par needs to stop smoking

## 2023-10-11 ENCOUNTER — NON-APPOINTMENT (OUTPATIENT)
Age: 53
End: 2023-10-11

## 2023-10-11 ENCOUNTER — APPOINTMENT (OUTPATIENT)
Dept: HEART AND VASCULAR | Facility: CLINIC | Age: 53
End: 2023-10-11
Payer: COMMERCIAL

## 2023-10-11 ENCOUNTER — APPOINTMENT (OUTPATIENT)
Dept: CARDIOLOGY | Facility: CLINIC | Age: 53
End: 2023-10-11
Payer: COMMERCIAL

## 2023-10-11 VITALS
TEMPERATURE: 98.2 F | BODY MASS INDEX: 30.84 KG/M2 | HEART RATE: 64 BPM | OXYGEN SATURATION: 95 % | SYSTOLIC BLOOD PRESSURE: 132 MMHG | WEIGHT: 248 LBS | HEIGHT: 75 IN | DIASTOLIC BLOOD PRESSURE: 69 MMHG

## 2023-10-11 DIAGNOSIS — R94.31 ABNORMAL ELECTROCARDIOGRAM [ECG] [EKG]: ICD-10-CM

## 2023-10-11 DIAGNOSIS — I10 ESSENTIAL (PRIMARY) HYPERTENSION: ICD-10-CM

## 2023-10-11 PROCEDURE — 93000 ELECTROCARDIOGRAM COMPLETE: CPT

## 2023-10-11 PROCEDURE — 93242 EXT ECG>48HR<7D RECORDING: CPT

## 2023-10-11 PROCEDURE — 99214 OFFICE O/P EST MOD 30 MIN: CPT | Mod: 25

## 2023-10-19 ENCOUNTER — APPOINTMENT (OUTPATIENT)
Dept: CARDIOLOGY | Facility: CLINIC | Age: 53
End: 2023-10-19
Payer: COMMERCIAL

## 2023-10-19 PROCEDURE — A9500: CPT

## 2023-10-19 PROCEDURE — 93015 CV STRESS TEST SUPVJ I&R: CPT

## 2023-10-19 PROCEDURE — 78452 HT MUSCLE IMAGE SPECT MULT: CPT

## 2023-10-20 RX ORDER — RIVAROXABAN 20 MG/1
20 TABLET, FILM COATED ORAL
Qty: 1 | Refills: 1 | Status: DISCONTINUED | COMMUNITY
Start: 2022-08-26 | End: 2023-10-20

## 2023-11-06 ENCOUNTER — APPOINTMENT (OUTPATIENT)
Dept: HEART AND VASCULAR | Facility: CLINIC | Age: 53
End: 2023-11-06
Payer: COMMERCIAL

## 2023-11-06 ENCOUNTER — NON-APPOINTMENT (OUTPATIENT)
Age: 53
End: 2023-11-06

## 2023-11-06 VITALS
HEART RATE: 68 BPM | SYSTOLIC BLOOD PRESSURE: 134 MMHG | HEIGHT: 75 IN | OXYGEN SATURATION: 95 % | BODY MASS INDEX: 30.84 KG/M2 | DIASTOLIC BLOOD PRESSURE: 72 MMHG | WEIGHT: 248 LBS | TEMPERATURE: 98.2 F

## 2023-11-06 DIAGNOSIS — R06.09 OTHER FORMS OF DYSPNEA: ICD-10-CM

## 2023-11-06 DIAGNOSIS — E78.5 HYPERLIPIDEMIA, UNSPECIFIED: ICD-10-CM

## 2023-11-06 PROCEDURE — 99214 OFFICE O/P EST MOD 30 MIN: CPT | Mod: 25

## 2023-11-06 PROCEDURE — 93000 ELECTROCARDIOGRAM COMPLETE: CPT

## 2024-01-01 NOTE — REASON FOR VISIT
Called telephone number listed in patient's demographics, left voicemail stating the appointment scheduled for Wednesday, November 27, 2024 with Dr. Sapp will need to be rescheduled, provider unavailable. Message also advise parent(s) to return my call to reschedule the appointment. Message also stated provider is not available 8:00am, 11:40am, 2:40pm, and 3:00pm for in person visits. If/when parent(s) returns my call, please schedule with PCP or available provider.     Thank you,  Caitie Dickerson, PSR  [facility]   [FreeTextEntry1] : BP f/u

## 2024-01-12 ENCOUNTER — APPOINTMENT (OUTPATIENT)
Dept: HEART AND VASCULAR | Facility: CLINIC | Age: 54
End: 2024-01-12

## 2024-01-15 ENCOUNTER — RX RENEWAL (OUTPATIENT)
Age: 54
End: 2024-01-15

## 2024-03-20 ENCOUNTER — NON-APPOINTMENT (OUTPATIENT)
Age: 54
End: 2024-03-20

## 2024-03-20 ENCOUNTER — APPOINTMENT (OUTPATIENT)
Dept: HEART AND VASCULAR | Facility: CLINIC | Age: 54
End: 2024-03-20
Payer: COMMERCIAL

## 2024-03-20 VITALS
OXYGEN SATURATION: 98 % | WEIGHT: 226 LBS | HEIGHT: 75 IN | DIASTOLIC BLOOD PRESSURE: 67 MMHG | TEMPERATURE: 97.2 F | HEART RATE: 54 BPM | BODY MASS INDEX: 28.1 KG/M2 | SYSTOLIC BLOOD PRESSURE: 123 MMHG

## 2024-03-20 DIAGNOSIS — E78.2 MIXED HYPERLIPIDEMIA: ICD-10-CM

## 2024-03-20 DIAGNOSIS — I48.0 PAROXYSMAL ATRIAL FIBRILLATION: ICD-10-CM

## 2024-03-20 DIAGNOSIS — I10 ESSENTIAL (PRIMARY) HYPERTENSION: ICD-10-CM

## 2024-03-20 DIAGNOSIS — E78.6 LIPOPROTEIN DEFICIENCY: ICD-10-CM

## 2024-03-20 PROCEDURE — 99214 OFFICE O/P EST MOD 30 MIN: CPT | Mod: 25

## 2024-03-20 PROCEDURE — 93000 ELECTROCARDIOGRAM COMPLETE: CPT

## 2024-03-20 RX ORDER — METOPROLOL SUCCINATE 50 MG/1
50 TABLET, EXTENDED RELEASE ORAL
Qty: 180 | Refills: 1 | Status: ACTIVE | COMMUNITY
Start: 2022-08-26 | End: 1900-01-01

## 2024-03-20 RX ORDER — APIXABAN 5 MG/1
5 TABLET, FILM COATED ORAL
Qty: 180 | Refills: 1 | Status: ACTIVE | COMMUNITY
Start: 2023-10-20 | End: 1900-01-01

## 2024-03-20 RX ORDER — VALSARTAN AND HYDROCHLOROTHIAZIDE 160; 25 MG/1; MG/1
160-25 TABLET, FILM COATED ORAL
Qty: 90 | Refills: 1 | Status: ACTIVE | COMMUNITY
Start: 2017-04-26 | End: 1900-01-01

## 2024-03-20 NOTE — DISCUSSION/SUMMARY
[FreeTextEntry1] : EKG;NSR continue eliquis + Toprol for paf; Valsartan for HPTN; exercise for low HDL meds refilled

## 2024-03-20 NOTE — PHYSICAL EXAM
[Well Developed] : well developed [Well Nourished] : well nourished [No Acute Distress] : no acute distress [Normal Conjunctiva] : normal conjunctiva [Normal Venous Pressure] : normal venous pressure [No Carotid Bruit] : no carotid bruit [Normal S1, S2] : normal S1, S2 [No Gallop] : no gallop [No Rub] : no rub [Good Air Entry] : good air entry [Clear Lung Fields] : clear lung fields [No Respiratory Distress] : no respiratory distress  [Soft] : abdomen soft [Non Tender] : non-tender [Normal Gait] : normal gait [No Edema] : no edema [No Cyanosis] : no cyanosis [No Clubbing] : no clubbing [No Rash] : no rash [Moves all extremities] : moves all extremities [Normal Speech] : normal speech [Alert and Oriented] : alert and oriented [de-identified] : soft systolic murmur:

## 2024-04-22 ENCOUNTER — APPOINTMENT (OUTPATIENT)
Dept: CARDIOLOGY | Facility: CLINIC | Age: 54
End: 2024-04-22
Payer: COMMERCIAL

## 2024-04-22 DIAGNOSIS — I35.1 NONRHEUMATIC AORTIC (VALVE) INSUFFICIENCY: ICD-10-CM

## 2024-04-22 PROCEDURE — 93306 TTE W/DOPPLER COMPLETE: CPT

## 2024-04-25 PROBLEM — I35.1 MILD AORTIC REGURGITATION: Status: ACTIVE | Noted: 2021-03-12

## 2024-09-17 ENCOUNTER — NON-APPOINTMENT (OUTPATIENT)
Age: 54
End: 2024-09-17

## 2024-09-18 ENCOUNTER — NON-APPOINTMENT (OUTPATIENT)
Age: 54
End: 2024-09-18

## 2024-09-18 ENCOUNTER — APPOINTMENT (OUTPATIENT)
Dept: HEART AND VASCULAR | Facility: CLINIC | Age: 54
End: 2024-09-18
Payer: COMMERCIAL

## 2024-09-18 VITALS
DIASTOLIC BLOOD PRESSURE: 64 MMHG | BODY MASS INDEX: 28.85 KG/M2 | TEMPERATURE: 98.2 F | HEART RATE: 48 BPM | SYSTOLIC BLOOD PRESSURE: 110 MMHG | OXYGEN SATURATION: 94 % | WEIGHT: 232 LBS | HEIGHT: 75 IN

## 2024-09-18 DIAGNOSIS — I10 ESSENTIAL (PRIMARY) HYPERTENSION: ICD-10-CM

## 2024-09-18 DIAGNOSIS — I48.0 PAROXYSMAL ATRIAL FIBRILLATION: ICD-10-CM

## 2024-09-18 DIAGNOSIS — R94.31 ABNORMAL ELECTROCARDIOGRAM [ECG] [EKG]: ICD-10-CM

## 2024-09-18 PROCEDURE — 99214 OFFICE O/P EST MOD 30 MIN: CPT | Mod: 25

## 2024-09-18 PROCEDURE — G2211 COMPLEX E/M VISIT ADD ON: CPT | Mod: NC

## 2024-09-18 PROCEDURE — 93000 ELECTROCARDIOGRAM COMPLETE: CPT

## 2024-09-18 PROCEDURE — 99406 BEHAV CHNG SMOKING 3-10 MIN: CPT

## 2024-09-18 NOTE — DISCUSSION/SUMMARY
[FreeTextEntry1] : EKG:NSR,PRWP needs to stop smoking- continue eliquis + toprol for PAF; valsartan for HPTN states had labs with PCP

## 2024-09-18 NOTE — PHYSICAL EXAM
[Well Developed] : well developed [Well Nourished] : well nourished [No Acute Distress] : no acute distress [Normal Conjunctiva] : normal conjunctiva [Normal Venous Pressure] : normal venous pressure [No Carotid Bruit] : no carotid bruit [Normal S1, S2] : normal S1, S2 [No Rub] : no rub [No Gallop] : no gallop [Clear Lung Fields] : clear lung fields [Good Air Entry] : good air entry [No Respiratory Distress] : no respiratory distress  [Soft] : abdomen soft [Non Tender] : non-tender [Normal Gait] : normal gait [No Edema] : no edema [No Cyanosis] : no cyanosis [No Clubbing] : no clubbing [No Rash] : no rash [Moves all extremities] : moves all extremities [Normal Speech] : normal speech [Alert and Oriented] : alert and oriented [de-identified] : soft systolic murmur:

## 2024-09-23 ENCOUNTER — APPOINTMENT (OUTPATIENT)
Dept: CARDIOLOGY | Facility: CLINIC | Age: 54
End: 2024-09-23
Payer: COMMERCIAL

## 2024-09-23 DIAGNOSIS — E78.2 MIXED HYPERLIPIDEMIA: ICD-10-CM

## 2024-09-23 PROCEDURE — 93015 CV STRESS TEST SUPVJ I&R: CPT

## 2024-09-23 PROCEDURE — 36415 COLL VENOUS BLD VENIPUNCTURE: CPT

## 2024-09-24 LAB
ALBUMIN SERPL ELPH-MCNC: 4.2 G/DL
ALP BLD-CCNC: 45 U/L
ALT SERPL-CCNC: 34 U/L
ANION GAP SERPL CALC-SCNC: 17 MMOL/L
AST SERPL-CCNC: 32 U/L
BILIRUB SERPL-MCNC: 0.7 MG/DL
BUN SERPL-MCNC: 17 MG/DL
CALCIUM SERPL-MCNC: 9.6 MG/DL
CHLORIDE SERPL-SCNC: 105 MMOL/L
CHOLEST SERPL-MCNC: 145 MG/DL
CO2 SERPL-SCNC: 20 MMOL/L
CREAT SERPL-MCNC: 0.95 MG/DL
EGFR: 94 ML/MIN/1.73M2
GLUCOSE SERPL-MCNC: 97 MG/DL
HDLC SERPL-MCNC: 34 MG/DL
LDLC SERPL CALC-MCNC: 85 MG/DL
NONHDLC SERPL-MCNC: 111 MG/DL
POTASSIUM SERPL-SCNC: 4.4 MMOL/L
PROT SERPL-MCNC: 7.1 G/DL
SODIUM SERPL-SCNC: 141 MMOL/L
TRIGL SERPL-MCNC: 147 MG/DL

## 2025-01-24 ENCOUNTER — NON-APPOINTMENT (OUTPATIENT)
Age: 55
End: 2025-01-24

## 2025-01-24 ENCOUNTER — APPOINTMENT (OUTPATIENT)
Dept: HEART AND VASCULAR | Facility: CLINIC | Age: 55
End: 2025-01-24
Payer: COMMERCIAL

## 2025-01-24 VITALS
SYSTOLIC BLOOD PRESSURE: 115 MMHG | HEART RATE: 96 BPM | OXYGEN SATURATION: 95 % | DIASTOLIC BLOOD PRESSURE: 72 MMHG | BODY MASS INDEX: 29.72 KG/M2 | HEIGHT: 75 IN | WEIGHT: 239 LBS | TEMPERATURE: 98 F

## 2025-01-24 DIAGNOSIS — R00.2 PALPITATIONS: ICD-10-CM

## 2025-01-24 DIAGNOSIS — I48.91 UNSPECIFIED ATRIAL FIBRILLATION: ICD-10-CM

## 2025-01-24 DIAGNOSIS — I10 ESSENTIAL (PRIMARY) HYPERTENSION: ICD-10-CM

## 2025-01-24 DIAGNOSIS — I48.0 PAROXYSMAL ATRIAL FIBRILLATION: ICD-10-CM

## 2025-01-24 DIAGNOSIS — E78.6 LIPOPROTEIN DEFICIENCY: ICD-10-CM

## 2025-01-24 PROCEDURE — 99214 OFFICE O/P EST MOD 30 MIN: CPT | Mod: 25

## 2025-01-24 PROCEDURE — 99406 BEHAV CHNG SMOKING 3-10 MIN: CPT

## 2025-01-24 PROCEDURE — 93000 ELECTROCARDIOGRAM COMPLETE: CPT

## 2025-02-04 ENCOUNTER — APPOINTMENT (OUTPATIENT)
Dept: ELECTROPHYSIOLOGY | Facility: CLINIC | Age: 55
End: 2025-02-04
Payer: COMMERCIAL

## 2025-02-04 ENCOUNTER — NON-APPOINTMENT (OUTPATIENT)
Age: 55
End: 2025-02-04

## 2025-02-04 VITALS
WEIGHT: 248 LBS | HEIGHT: 75 IN | HEART RATE: 53 BPM | OXYGEN SATURATION: 95 % | BODY MASS INDEX: 30.84 KG/M2 | SYSTOLIC BLOOD PRESSURE: 107 MMHG | TEMPERATURE: 97.6 F | DIASTOLIC BLOOD PRESSURE: 62 MMHG

## 2025-02-04 DIAGNOSIS — I49.1 ATRIAL PREMATURE DEPOLARIZATION: ICD-10-CM

## 2025-02-04 DIAGNOSIS — I10 ESSENTIAL (PRIMARY) HYPERTENSION: ICD-10-CM

## 2025-02-04 DIAGNOSIS — I48.0 PAROXYSMAL ATRIAL FIBRILLATION: ICD-10-CM

## 2025-02-04 DIAGNOSIS — R00.2 PALPITATIONS: ICD-10-CM

## 2025-02-04 PROCEDURE — 99204 OFFICE O/P NEW MOD 45 MIN: CPT

## 2025-02-04 PROCEDURE — G2211 COMPLEX E/M VISIT ADD ON: CPT | Mod: NC

## 2025-02-04 PROCEDURE — 93000 ELECTROCARDIOGRAM COMPLETE: CPT

## 2025-03-27 ENCOUNTER — OUTPATIENT (OUTPATIENT)
Dept: OUTPATIENT SERVICES | Facility: HOSPITAL | Age: 55
LOS: 1 days | End: 2025-03-27

## 2025-03-27 VITALS
SYSTOLIC BLOOD PRESSURE: 124 MMHG | HEART RATE: 60 BPM | TEMPERATURE: 99 F | WEIGHT: 240.08 LBS | OXYGEN SATURATION: 97 % | DIASTOLIC BLOOD PRESSURE: 79 MMHG | HEIGHT: 76 IN | RESPIRATION RATE: 16 BRPM

## 2025-03-27 DIAGNOSIS — Z98.890 OTHER SPECIFIED POSTPROCEDURAL STATES: Chronic | ICD-10-CM

## 2025-03-27 DIAGNOSIS — I48.91 UNSPECIFIED ATRIAL FIBRILLATION: ICD-10-CM

## 2025-03-27 LAB
ANION GAP SERPL CALC-SCNC: 13 MMOL/L — SIGNIFICANT CHANGE UP (ref 7–14)
BASOPHILS # BLD AUTO: 0.05 K/UL — SIGNIFICANT CHANGE UP (ref 0–0.2)
BASOPHILS NFR BLD AUTO: 0.6 % — SIGNIFICANT CHANGE UP (ref 0–2)
BLD GP AB SCN SERPL QL: NEGATIVE — SIGNIFICANT CHANGE UP
BUN SERPL-MCNC: 19 MG/DL — SIGNIFICANT CHANGE UP (ref 7–23)
CALCIUM SERPL-MCNC: 9.4 MG/DL — SIGNIFICANT CHANGE UP (ref 8.4–10.5)
CHLORIDE SERPL-SCNC: 103 MMOL/L — SIGNIFICANT CHANGE UP (ref 98–107)
CO2 SERPL-SCNC: 23 MMOL/L — SIGNIFICANT CHANGE UP (ref 22–31)
CREAT SERPL-MCNC: 0.96 MG/DL — SIGNIFICANT CHANGE UP (ref 0.5–1.3)
EGFR: 93 ML/MIN/1.73M2 — SIGNIFICANT CHANGE UP
EGFR: 93 ML/MIN/1.73M2 — SIGNIFICANT CHANGE UP
EOSINOPHIL # BLD AUTO: 0.24 K/UL — SIGNIFICANT CHANGE UP (ref 0–0.5)
EOSINOPHIL NFR BLD AUTO: 2.7 % — SIGNIFICANT CHANGE UP (ref 0–6)
GLUCOSE SERPL-MCNC: 114 MG/DL — HIGH (ref 70–99)
HCT VFR BLD CALC: 52.2 % — HIGH (ref 39–50)
HGB BLD-MCNC: 17.5 G/DL — HIGH (ref 13–17)
IMM GRANULOCYTES NFR BLD AUTO: 0.3 % — SIGNIFICANT CHANGE UP (ref 0–0.9)
LYMPHOCYTES # BLD AUTO: 2.21 K/UL — SIGNIFICANT CHANGE UP (ref 1–3.3)
LYMPHOCYTES # BLD AUTO: 24.4 % — SIGNIFICANT CHANGE UP (ref 13–44)
MCHC RBC-ENTMCNC: 31.6 PG — SIGNIFICANT CHANGE UP (ref 27–34)
MCHC RBC-ENTMCNC: 33.5 G/DL — SIGNIFICANT CHANGE UP (ref 32–36)
MCV RBC AUTO: 94.4 FL — SIGNIFICANT CHANGE UP (ref 80–100)
MONOCYTES # BLD AUTO: 1 K/UL — HIGH (ref 0–0.9)
MONOCYTES NFR BLD AUTO: 11.1 % — SIGNIFICANT CHANGE UP (ref 2–14)
NEUTROPHILS # BLD AUTO: 5.51 K/UL — SIGNIFICANT CHANGE UP (ref 1.8–7.4)
NEUTROPHILS NFR BLD AUTO: 60.9 % — SIGNIFICANT CHANGE UP (ref 43–77)
PLATELET # BLD AUTO: 214 K/UL — SIGNIFICANT CHANGE UP (ref 150–400)
POTASSIUM SERPL-MCNC: 3.8 MMOL/L — SIGNIFICANT CHANGE UP (ref 3.5–5.3)
POTASSIUM SERPL-SCNC: 3.8 MMOL/L — SIGNIFICANT CHANGE UP (ref 3.5–5.3)
RBC # BLD: 5.53 M/UL — SIGNIFICANT CHANGE UP (ref 4.2–5.8)
RBC # FLD: 13.6 % — SIGNIFICANT CHANGE UP (ref 10.3–14.5)
RH IG SCN BLD-IMP: POSITIVE — SIGNIFICANT CHANGE UP
RH IG SCN BLD-IMP: POSITIVE — SIGNIFICANT CHANGE UP
SODIUM SERPL-SCNC: 139 MMOL/L — SIGNIFICANT CHANGE UP (ref 135–145)
WBC # BLD: 9.04 K/UL — SIGNIFICANT CHANGE UP (ref 3.8–10.5)
WBC # FLD AUTO: 9.04 K/UL — SIGNIFICANT CHANGE UP (ref 3.8–10.5)

## 2025-03-27 NOTE — H&P PST ADULT - CARDIOVASCULAR COMMENTS
has PAF last episode of palpitations was "about a year ago" last episode of palpitations was "about a year ago", pt. has PAF, no c/o symptoms currently, has mild to moderate aortic regurgitation, has APC's

## 2025-03-27 NOTE — H&P PST ADULT - HISTORY OF PRESENT ILLNESS
Pt. has PMHX of PAF, HTN, CHOL, sinus arrythmia, APC's.  Pt. presents to PST with unspecified atrial fibrillation to be evaluated for AF ablation w. PFA.      Pt. is a 55 yo male that initially had atrial fibrillation several years ago and was started on Eliquis.  Pt. recently had an episode of atrial fibrillation captured on EKG from 1/24/25.

## 2025-03-27 NOTE — H&P PST ADULT - NSICDXFAMILYHX_GEN_ALL_CORE_FT
FAMILY HISTORY:  Mother  Still living? Yes, Estimated age: Age Unknown  Family history of Sjogren's disease, Age at diagnosis: Age Unknown  FH: HTN (hypertension), Age at diagnosis: Age Unknown    Grandparent  Still living? No  FH: diabetes mellitus, Age at diagnosis: Age Unknown  FH: HTN (hypertension), Age at diagnosis: Age Unknown

## 2025-03-27 NOTE — H&P PST ADULT - NSICDXPASTMEDICALHX_GEN_ALL_CORE_FT
"Chief Complaint  Sore Throat (drainage) and Earache    Subjective          Marilee Holbrook presents to [facility]  History of Present Illness      Is here today with c/o increased drainage, sore throat started yesterday  Throat feels very sore, describes as \"shrunken\"    Drainage is causing her to have a dry cough    She has had exposure by a co worker    Review of Systems   Constitutional:  Positive for fatigue. Negative for appetite change, chills, diaphoresis and fever.   HENT:  Positive for postnasal drip and sore throat.         Ears are tender with swallowing   Respiratory:  Positive for cough. Negative for shortness of breath and wheezing.         Dry cough, relates it to the drainage in her throat   Cardiovascular:  Negative for chest pain.   Gastrointestinal: Negative.    Neurological:  Negative for dizziness.        Endorses chronic headache   Psychiatric/Behavioral:  Positive for sleep disturbance.         She endorses that she has not been sleeping well, unrelated to this illness, and so she is feeling tired     Objective   Vital Signs:  /74   Pulse 93   Resp 20   Ht 157.5 cm (62.01\")   Wt 78.5 kg (173 lb)   SpO2 99%   BMI 31.63 kg/m²     BP Readings from Last 3 Encounters:   01/05/24 122/74   10/24/23 109/83   07/25/23 94/65        Wt Readings from Last 3 Encounters:   01/05/24 78.5 kg (173 lb)   10/24/23 79.4 kg (175 lb)   07/25/23 78.6 kg (173 lb 3.2 oz)              Physical Exam  Vitals reviewed.   Constitutional:       Appearance: Normal appearance.   HENT:      Right Ear: Ear canal and external ear normal. A middle ear effusion is present.      Left Ear: Ear canal and external ear normal. A middle ear effusion is present.      Ears:      Comments: Clear fluid b/l     Nose: Nose normal.      Mouth/Throat:      Mouth: Mucous membranes are moist.      Pharynx: Posterior oropharyngeal erythema present. No oropharyngeal exudate.   Cardiovascular:      Rate " Pt arrived for 3 week labs, NP visit and treatment for Lung cancer.   and Rhythm: Normal rate and regular rhythm.      Pulses: Normal pulses.      Heart sounds: Normal heart sounds.   Pulmonary:      Effort: Pulmonary effort is normal.      Breath sounds: Normal breath sounds.   Musculoskeletal:      Cervical back: Neck supple. No tenderness.   Lymphadenopathy:      Cervical: No cervical adenopathy.   Skin:     General: Skin is warm.   Neurological:      Mental Status: She is alert and oriented to person, place, and time.   Psychiatric:         Mood and Affect: Mood normal.        Result Review :                 Assessment and Plan    Diagnoses and all orders for this visit:    1. Pharyngitis, unspecified etiology (Primary)  -     POCT SARS-CoV-2 Antigen CASSIDY + Flu  -     POCT rapid strep A    Encouraged hydration, otc preps for symptom management, ibuprofen if needed for pain or fever       Follow Up   No follow-ups on file.  Patient was given instructions and counseling regarding her condition or for health maintenance advice. Please see specific information pulled into the AVS if appropriate.        PAST MEDICAL HISTORY:  HTN (hypertension)     Paroxysmal atrial fibrillation     Sleep apnea      PAST MEDICAL HISTORY:  APC (atrial premature contractions)     History of palpitations     HTN (hypertension)     Hyperlipidemia     Paroxysmal atrial fibrillation     Sleep apnea

## 2025-04-02 NOTE — ASU PATIENT PROFILE, ADULT - CAREGIVER RELATION TO PATIENT
Fatmata is a 31 year old year old  at 16w2d who presents for routine OB care.     She denies contractions/cramping, loss of fluid, vaginal bleeding. Reports fetal flutters    Other concerns: has been out of breath since pregnancy going up    Problems:   1. Supervision of other normal pregnancy, antepartum  Cell free DNA negative, declines for AFP today after counseling-would like to await anatomy scan     2. Obesity in pregnancy:  BMI > 35  And h/o macrosomia 4150 g   Early DMS done today   BPP weekly at 37 weeks   Discussed goal weight gain of 11-20 pounds. Considering nutritionist referral.   growth scan @ 32 weeks     3. History of complicated labor and delivery  H/o 3rd degree laceration with break-down/revision with urogyn. Offered primary . She understands this is an option. She is hoping to have a vaginal birth, but considering induction of labor at 39 weeks. Denies fecal incontinence. Will further discuss at 20 week visit with Dr. Arvizu. Planning follow-up with pelvic floor PT 22.     OB labs and vitals reviewed.     Precautions discussed and addressed.     Return in 4 weeks    Tia Roy MD  Obstetrician/Gynecologist          daughter

## 2025-04-02 NOTE — ASU PATIENT PROFILE, ADULT - SURGERY TIME
[facility] URGENT CARE COURSE:    Azam was seen today for knee pain.    Diagnoses and all orders for this visit:    Sprain of right knee, unspecified ligament, initial encounter  -     KO ELASTIC W/JOINTS  -     CRUTCHES, UNDERARM, NOT WOOD    Acute pain of right knee  -     XR KNEE 3 VW RIGHT; Future               Plan:  Recommend plenty of fluids, hydration orally. . Rest. Avoid undue exertion. Watch for any increasing/worsening symptoms.  Recommendation to follow with the primary care physician in next 2-7 days. If the symptoms continues or  worsen contact primary care physician or  recommend patient go to the emergency room.     Dr. Sierra Wagner M.D.  [facility]  [address] [address] [place] [address]  Telephone:  [phone]        
07:30

## 2025-04-02 NOTE — ASU PATIENT PROFILE, ADULT - FALL HARM RISK - UNIVERSAL INTERVENTIONS
Bed in lowest position, wheels locked, appropriate side rails in place/Call bell, personal items and telephone in reach/Instruct patient to call for assistance before getting out of bed or chair/Non-slip footwear when patient is out of bed/Julian to call system/Physically safe environment - no spills, clutter or unnecessary equipment/Purposeful Proactive Rounding/Room/bathroom lighting operational, light cord in reach

## 2025-04-02 NOTE — ASU PATIENT PROFILE, ADULT - PREOP PAIN SCORE
[facility] Infectious Disease Progress Note    SUBJECTIVE  Afebrile HDS   S/p prbc transfusion   No acutee overnight events   Feels improved since admission     MEDICATIONS  Current Facility-Administered Medications   Medication Dose Route Frequency Provider Last Rate Last Admin   • mupirocin (BACTROBAN) 2 % ointment 1 application  1 application Each Nare 2 times per day Yohana Fraire MD   1 application at 11/25/21 0842   • vancomycin 1,500 mg in sodium chloride 0.9% 250 mL IVPB  1,500 mg Intravenous Once Higinio Tran MD       • sodium chloride 0.9% infusion   Intravenous Continuous PRN Madhu Rivera MD       • sodium chloride 0.9 % flush bag 25 mL  25 mL Intravenous PRN Yohana Fraire MD       • sodium chloride (PF) 0.9 % injection 2 mL  2 mL Intracatheter 2 times per day Yohana Fraire MD   2 mL at 11/25/21 0825   • ondansetron (ZOFRAN) injection 4 mg  4 mg Intravenous Q8H PRN Yohana Fraire MD       • acetaminophen (TYLENOL) tablet 650 mg  650 mg Oral Q4H PRN Yohana Fraire MD   650 mg at 11/25/21 0842   • polyethylene glycol (MIRALAX) packet 17 g  17 g Oral Daily PRN Yohana Fraire MD       • aluminum-magnesium hydroxide-simethicone (MAALOX) 200-200-20 MG/5ML suspension 30 mL  30 mL Oral 4x Daily PRN Yohana Fraire MD       • sodium chloride 0.9 % flush bag 25 mL  25 mL Intravenous PRN Yohana Fraire MD       • sodium chloride (NORMAL SALINE) 0.9 % bolus 500 mL  500 mL Intravenous PRN Yohana Fraire MD       • HYDROcodone-acetaminophen (NORCO) 5-325 MG per tablet 1 tablet  1 tablet Oral Q4H PRN Yohana Fraire MD   1 tablet at 11/25/21 0601   • sodium chloride (NORMAL SALINE) 0.9 % bolus 100-200 mL  100-200 mL Intravenous PRN Lorene Martin MD       • [START ON 11/26/2021] heparin (porcine) injection 1,000 Units  1,000 Units Intracatheter PRN Lorene Martin MD       • [START ON 11/26/2021] sodium chloride (NORMAL SALINE) 0.9 % bolus 100-200 mL  100-200 mL Intravenous PRN Lorene Martin MD       • allopurinol (ZYLOPRIM) tablet 100 mg  100  mg Oral BID Madhu Rivera MD   100 mg at 11/25/21 0825   • [Held by provider] amLODIPine (NORVASC) tablet 10 mg  10 mg Oral Daily Madhu Rivera MD       • atorvastatin (LIPITOR) tablet 40 mg  40 mg Oral Daily Madhu Rivera MD   40 mg at 11/25/21 0825   • cinacalcet (SENSIPAR) tablet 30 mg  30 mg Oral Daily Madhu Rivera MD   30 mg at 11/25/21 0825   • folic acid (FOLATE) tablet 1 mg  1 mg Oral Daily Madhu Rivera MD   1 mg at 11/25/21 0825   • [Held by provider] hydrALAZINE (APRESOLINE) tablet 50 mg  50 mg Oral TID Madhu Rivera MD       • levothyroxine (SYNTHROID, LEVOTHROID) tablet 225 mcg  225 mcg Oral QAM AC Madhu Rivera MD   225 mcg at 11/25/21 0825   • metoPROLOL succinate (TOPROL-XL) ER tablet 50 mg  50 mg Oral Nightly Madhu Rivera MD   50 mg at 11/24/21 2148   • sevelamer carbonate (RENVELA) tablet 2,400 mg  2,400 mg Oral TID WC Madhu Rivera MD   2,400 mg at 11/25/21 0824   • venlafaxine XR (EFFEXOR XR) 24 hr capsule 75 mg  75 mg Oral Daily Madhu Rivera MD   75 mg at 11/25/21 0825   • zolpidem (AMBIEN) tablet 5 mg  5 mg Oral Nightly PRN Madhu Rivera MD       • ceFAZolin (ANCEF) syringe 2,000 mg  2,000 mg Intravenous QHS Higinio Tran MD   2,000 mg at 11/24/21 2147       Allergy:  ALLERGIES:  No Known Allergies    PHYSICAL EXAM  Temp:  [96.8 °F (36 °C)-98.8 °F (37.1 °C)] 98 °F (36.7 °C)  Heart Rate:  [74-99] 78  Resp:  [15-27] 24  BP: (102-153)/() 149/83    Physical Exam   Constitutional:       Appearance: Normal appearance. She is obese.   HENT:      Head: Normocephalic.      Right Ear: Tympanic membrane normal.      Left Ear: Tympanic membrane normal.      Nose: Nose normal.      Mouth/Throat:      Mouth: Mucous membranes are moist.   Eyes:      Pupils: Pupils are equal, round, and reactive to light.   Cardiovascular:      Rate and Rhythm: Normal rate and regular rhythm.      Pulses: Normal pulses.      Heart sounds: Normal heart sounds.   Abdominal:      General: Abdomen is flat.  Bowel sounds are normal.      Palpations: Abdomen is soft.   Genitourinary:     General: Normal vulva.      Rectum: Normal.   Musculoskeletal:         General: Normal range of motion.      Cervical back: Normal range of motion and neck supple.   Skin:     General: Skin is warm and dry.      Capillary Refill: Capillary refill takes less than 2 seconds.      Comments: Extensive bruising, and this patient who is obese, involving the right thigh, right groin right gluteal region  Changes of chronic venous stasis dermatitis, mild erythema   Neurological:      General: No focal deficit present.      Mental Status: She is alert and oriented to person, place, and time.   Psychiatric:         Mood and Affect: Mood normal.         Behavior: Behavior normal.   LABORATORY  Recent Results (from the past 24 hour(s))   Hepatitis B Surface Antigen    Collection Time: 11/24/21 10:52 AM   Result Value Ref Range    Hepatitis B Surface Antigen Negative Negative   Hemoglobin and Hematocrit    Collection Time: 11/24/21 10:56 AM   Result Value Ref Range    HGB 7.9 (L) 12.0 - 15.5 g/dL    HCT 25.0 (L) 36.0 - 46.5 %   Prepare Fresh Frozen Plasma: 2 Units    Collection Time: 11/24/21 11:20 AM   Result Value Ref Range    UNIT BLOOD TYPE A Pos     ISBT BLOOD TYPE 6200     BLOOD EXPIRATION DATE 77355170005251     UNIT NUMBER [id number]     DISPENSE STATUS Transfused     PRODUCT ID Fresh Frozen Plasma     PRODUCT CODE [id number]     PRODUCT DESCRIPTION FFP CPD     UNIT BLOOD TYPE A Pos     ISBT BLOOD TYPE 6200     BLOOD EXPIRATION DATE 97270922430308     UNIT NUMBER [id number]     DISPENSE STATUS Transfused     PRODUCT ID Fresh Frozen Plasma     PRODUCT CODE [id number]     PRODUCT DESCRIPTION FFP ACD-A     ISSUE DATE/TIME 37498617545098     ISSUE DATE/TIME [id number]    Hemoglobin and Hematocrit    Collection Time: 11/24/21  6:33 PM   Result Value Ref Range    HGB 6.6 (LL) 12.0 - 15.5 g/dL    HCT 21.0 (L) 36.0 - 46.5 %   GLUCOSE, BEDSIDE -  POINT OF CARE    Collection Time: 11/24/21  8:15 PM   Result Value Ref Range    GLUCOSE, BEDSIDE - POINT OF CARE 143 (H) 70 - 99 mg/dL   Prepare Red Blood Cells: 1 Units    Collection Time: 11/24/21  8:28 PM   Result Value Ref Range    UNIT BLOOD TYPE A Pos     ISBT BLOOD TYPE 6200     BLOOD EXPIRATION DATE 44994368059718     UNIT NUMBER [id number]     DISPENSE STATUS Issued     PRODUCT ID Red Blood Cells     PRODUCT CODE [id number]     PRODUCT DESCRIPTION RBC AS-1 LR     CROSSMATCH RESULT Compatible     ISSUE DATE/TIME 20211124220800    Staphylococcus aureus Methicillin Resistant (MRSA) PCR    Collection Time: 11/24/21  9:02 PM    Specimen: Nares; Swab   Result Value Ref Range    MRSA PCR Detected (A) Not Detected   Magnesium    Collection Time: 11/25/21  4:27 AM   Result Value Ref Range    Magnesium 2.1 1.7 - 2.4 mg/dL   Basic Metabolic Panel    Collection Time: 11/25/21  4:27 AM   Result Value Ref Range    Fasting Status      Sodium 135 135 - 145 mmol/L    Potassium 4.8 3.4 - 5.1 mmol/L    Chloride 100 98 - 107 mmol/L    Carbon Dioxide 25 21 - 32 mmol/L    Anion Gap 15 10 - 20 mmol/L    Glucose 130 (H) 70 - 99 mg/dL    BUN 57 (H) 6 - 20 mg/dL    Creatinine 8.70 (H) 0.51 - 0.95 mg/dL    Glomerular Filtration Rate 4 (L) >=60    BUN/ Creatinine Ratio 7 7 - 25    Calcium 8.4 8.4 - 10.2 mg/dL   CBC No Differential    Collection Time: 11/25/21  4:27 AM   Result Value Ref Range    WBC 8.0 4.2 - 11.0 K/mcL    RBC 2.31 (L) 4.00 - 5.20 mil/mcL    HGB 7.0 (L) 12.0 - 15.5 g/dL    HCT 22.3 (L) 36.0 - 46.5 %    MCV 96.5 78.0 - 100.0 fl    MCH 30.3 26.0 - 34.0 pg    MCHC 31.4 (L) 32.0 - 36.5 g/dL     140 - 450 K/mcL    RDW-CV 20.0 (H) 11.0 - 15.0 %    RDW-SD 68.2 (H) 39.0 - 50.0 fL    NRBC 2 (H) <=0 /100 WBC   Electrocardiogram 12-Lead    Collection Time: 11/25/21  4:31 AM   Result Value Ref Range    Ventricular Rate EKG/Min (BPM) 83     Atrial Rate (BPM) 83     QRS-Interval (MSEC) 162     QT-Interval (MSEC) 442     QTc  519     R Axis (Degrees) -3     T Axis (Degrees) 145     REPORT TEXT       Normal sinus rhythm  Right bundle branch block  Inferior infarct  (cited on or before  30-JAN-2021)  T wave abnormality, consider lateral ischemia  Abnormal ECG  When compared with ECG of  29-OCT-2021 22:16,  No significant change was found     GLUCOSE, BEDSIDE - POINT OF CARE    Collection Time: 11/25/21  7:37 AM   Result Value Ref Range    GLUCOSE, BEDSIDE - POINT OF CARE 141 (H) 70 - 99 mg/dL       Microbiology Results     None           IMAGING  IR PELVIC ANGIOGRAM   Final Result   No evidence of active bleeding in the area of the right gluteal/hip region.    Subsequent prophylactic Gelfoam embolization performed within the   posterior division of the right internal iliac artery.      Electronically Signed by: TYSON TA MD    Signed on: 11/24/2021 1:57 PM          CT ABDOMEN PELVIS W CONTRAST - IV contrast only   Final Result   Addendum 1 of 1   ADDENDUM:   11/24/21 04:15 Call Doctor Regarding Active Bleeding in any site, called     Dr. Villalobos on 11/24 04:15 (-06:00)            Final     Large right posterior lateral pelvic wall hematoma involving    subcutaneous fat and adjacent gluteal musculature.  Small focus of    hyperdensity suspicious for acute/active bleeding.  No underlying    fracture.     No intra-abdominal or pelvic free fluid or collection.  No evidence for    retroperitoneal hematoma.     Well distended gallbladder without gallstones or gross wall thickening.     No evidence for biliary obstruction.     Severely atrophic native kidneys.  Contracted urinary bladder.   IVC filter.  Left common femoral central venous line with tip in the    region of the common femoral vein.  Linear hyperdensity in the common    femoral and external iliac vein, possibly catheter remnant versus    opacification of a internal iliac vein vessel..            Communications:       Call Doctor Active Bleeding in any site      Electronically  signed by Terrell Dave M.D. on 11 24 21 at 04:06      CT HEAD WO CONTRAST   Final Result     No acute abnormality.     Unchanged bilateral basal ganglia and caudate lacunar infarcts.                 Electronically signed by Terrell Dave M.D. on 11 24 21 at 03:49      XR HIP 2 VIEWS RIGHT W PELVIS 1 VIEW   Final Result      1.    No acute osseous abnormality of the pelvis or right hip.   2.   Mild osteoarthrosis of the right hip.      Electronically Signed by: KAT VANG MD    Signed on: 11/23/2021 9:06 PM                    IMPRESSION AND PLAN    60-year-old female, well-known to our group past medical history of CAD, status post CABG, HF PEF, cardiomyopathy hypertension, PE, DVT, on chronic anticoagulation with Coumadin  p/ w/ right gluteal hematoma and concerns for cellulitis     - c/w iv cefazolin and vanco  for now   - monitor HB   - GS following apprec recs . Consult IR for any embolization if concern for rebleeding  - Hold minocycline suppression for now for recurrent cellulitis  - ctm labs     D/w pt and staff   Reviewed imaging labs and micro  willl follow       Sera Bosch MD  11/25/2021 9:24 AM   0

## 2025-04-02 NOTE — ASU PATIENT PROFILE, ADULT - NSICDXPASTMEDICALHX_GEN_ALL_CORE_FT
PAST MEDICAL HISTORY:  APC (atrial premature contractions)     History of palpitations     HTN (hypertension)     Hyperlipidemia     Paroxysmal atrial fibrillation     Sleep apnea

## 2025-04-03 ENCOUNTER — OUTPATIENT (OUTPATIENT)
Dept: OUTPATIENT SERVICES | Facility: HOSPITAL | Age: 55
LOS: 1 days | Discharge: HOME | End: 2025-04-03
Payer: COMMERCIAL

## 2025-04-03 ENCOUNTER — TRANSCRIPTION ENCOUNTER (OUTPATIENT)
Age: 55
End: 2025-04-03

## 2025-04-03 VITALS
SYSTOLIC BLOOD PRESSURE: 100 MMHG | RESPIRATION RATE: 20 BRPM | DIASTOLIC BLOOD PRESSURE: 68 MMHG | HEART RATE: 78 BPM | OXYGEN SATURATION: 99 %

## 2025-04-03 VITALS
TEMPERATURE: 98 F | DIASTOLIC BLOOD PRESSURE: 96 MMHG | OXYGEN SATURATION: 95 % | SYSTOLIC BLOOD PRESSURE: 144 MMHG | RESPIRATION RATE: 17 BRPM | HEART RATE: 84 BPM | HEIGHT: 77 IN | WEIGHT: 240.08 LBS

## 2025-04-03 DIAGNOSIS — Z98.890 OTHER SPECIFIED POSTPROCEDURAL STATES: Chronic | ICD-10-CM

## 2025-04-03 DIAGNOSIS — I48.91 UNSPECIFIED ATRIAL FIBRILLATION: ICD-10-CM

## 2025-04-03 PROCEDURE — 93010 ELECTROCARDIOGRAM REPORT: CPT | Mod: 76

## 2025-04-03 PROCEDURE — 93656 COMPRE EP EVAL ABLTJ ATR FIB: CPT

## 2025-04-03 PROCEDURE — 93623 PRGRMD STIMJ&PACG IV RX NFS: CPT | Mod: 26

## 2025-04-03 PROCEDURE — 93655 ICAR CATH ABLTJ DSCRT ARRHYT: CPT

## 2025-04-03 RX ORDER — APIXABAN 2.5 MG/1
1 TABLET, FILM COATED ORAL
Qty: 0 | Refills: 0 | DISCHARGE

## 2025-04-03 RX ORDER — METOPROLOL SUCCINATE 50 MG/1
1 TABLET, EXTENDED RELEASE ORAL
Refills: 0 | DISCHARGE

## 2025-04-03 NOTE — ASU DISCHARGE PLAN (ADULT/PEDIATRIC) - FINANCIAL ASSISTANCE
[facility] provides services at a reduced cost to those who are determined to be eligible through [facility]’s financial assistance program. Information regarding [facility]’s financial assistance program can be found by going to [email or web address] or by calling [phone].

## 2025-04-03 NOTE — ASU DISCHARGE PLAN (ADULT/PEDIATRIC) - ASU DC SPECIAL INSTRUCTIONSFT
Patient s/p ablation   Discharge instructions reviewed with patient and written instructions given   Patient understands he will take off groin dressing 24 hours from procedure and then can shower with only water at the site; no soap, lotion, cream is to be applied to site ; patient will not put any dressing on the site once the initial dressing is removed  Site will not be submerged under water x 1 week including activities in the pool, bath or jacuzzi   Activity restrictions went over with patient including 1 week of refraining from strenuous activity including jogging, running, or driving   Any indication of infection including fever, chills, redness, swelling or discharge at the site - patient will call office [phone]  For serious symptoms like chest pain, SOB, lightheadedness, dizziness or syncope - patient was directed to the closest ER  All medications and diet reviewed   Follow up Friday 5/9 at 8:00am   [phone] [place] [address]   Oncology [facility] 4th Floor   [place], [place] [id number] [phone]

## 2025-04-03 NOTE — PRE PROCEDURE NOTE - PRE PROCEDURE EVALUATION
The patient presents today for atrial fibrillation ablation  See H&P from presurgical testing.   The patient denies any new complaints since the last time she was seen by Dr. Franz.  Medications reviewed.    NPO Date and Time: 4/2 at 2200  Mallampati: 3  Anticoagulation Date and Time? Eliquis on 4/2 at 1900, admits to long term compliance   Previous Endoscopy?  NO  Hx of CVA?  NO  Sleep Apnea?  YES, no CPAP use   Dentures? NO  Loose Teeth? NO

## 2025-04-03 NOTE — ASU PREOP CHECKLIST - TEMPERATURE IN CELSIUS (DEGREES C)
Outreach attempts to coordinate scheduling on the patient's Service to Pulmonary Medicine order in workqueue [id number] requested on 11/17/2022 have been conducted.  Please contact Walter if further coordination is needed.  
36.6

## 2025-05-05 ENCOUNTER — NON-APPOINTMENT (OUTPATIENT)
Age: 55
End: 2025-05-05

## 2025-05-09 ENCOUNTER — NON-APPOINTMENT (OUTPATIENT)
Age: 55
End: 2025-05-09

## 2025-05-09 ENCOUNTER — APPOINTMENT (OUTPATIENT)
Dept: ELECTROPHYSIOLOGY | Facility: CLINIC | Age: 55
End: 2025-05-09
Payer: COMMERCIAL

## 2025-05-09 VITALS
OXYGEN SATURATION: 96 % | RESPIRATION RATE: 16 BRPM | HEIGHT: 77 IN | DIASTOLIC BLOOD PRESSURE: 80 MMHG | SYSTOLIC BLOOD PRESSURE: 122 MMHG | TEMPERATURE: 97.6 F | WEIGHT: 240 LBS | HEART RATE: 89 BPM | BODY MASS INDEX: 28.34 KG/M2

## 2025-05-09 DIAGNOSIS — Z86.79 OTHER SPECIFIED POSTPROCEDURAL STATES: ICD-10-CM

## 2025-05-09 DIAGNOSIS — I10 ESSENTIAL (PRIMARY) HYPERTENSION: ICD-10-CM

## 2025-05-09 DIAGNOSIS — I48.91 UNSPECIFIED ATRIAL FIBRILLATION: ICD-10-CM

## 2025-05-09 DIAGNOSIS — I48.0 PAROXYSMAL ATRIAL FIBRILLATION: ICD-10-CM

## 2025-05-09 DIAGNOSIS — R00.2 PALPITATIONS: ICD-10-CM

## 2025-05-09 DIAGNOSIS — Z98.890 OTHER SPECIFIED POSTPROCEDURAL STATES: ICD-10-CM

## 2025-05-09 PROBLEM — G47.30 SLEEP APNEA, UNSPECIFIED: Chronic | Status: ACTIVE | Noted: 2025-03-27

## 2025-05-09 PROBLEM — Z87.898 PERSONAL HISTORY OF OTHER SPECIFIED CONDITIONS: Chronic | Status: ACTIVE | Noted: 2025-03-27

## 2025-05-09 PROBLEM — E78.5 HYPERLIPIDEMIA, UNSPECIFIED: Chronic | Status: ACTIVE | Noted: 2025-03-27

## 2025-05-09 PROBLEM — I49.1 ATRIAL PREMATURE DEPOLARIZATION: Chronic | Status: ACTIVE | Noted: 2025-03-27

## 2025-05-09 PROCEDURE — 99214 OFFICE O/P EST MOD 30 MIN: CPT | Mod: 25

## 2025-05-09 PROCEDURE — 93000 ELECTROCARDIOGRAM COMPLETE: CPT

## 2025-07-11 ENCOUNTER — APPOINTMENT (OUTPATIENT)
Dept: HEART AND VASCULAR | Facility: CLINIC | Age: 55
End: 2025-07-11
Payer: COMMERCIAL

## 2025-07-11 VITALS
SYSTOLIC BLOOD PRESSURE: 137 MMHG | TEMPERATURE: 98 F | BODY MASS INDEX: 28.93 KG/M2 | HEIGHT: 77 IN | OXYGEN SATURATION: 95 % | DIASTOLIC BLOOD PRESSURE: 85 MMHG | WEIGHT: 245 LBS | HEART RATE: 83 BPM

## 2025-07-11 PROCEDURE — 99214 OFFICE O/P EST MOD 30 MIN: CPT | Mod: 25

## 2025-07-11 PROCEDURE — 99406 BEHAV CHNG SMOKING 3-10 MIN: CPT

## 2025-07-11 PROCEDURE — 93000 ELECTROCARDIOGRAM COMPLETE: CPT

## 2025-07-24 PROBLEM — I48.4 ATYPICAL ATRIAL FLUTTER: Status: ACTIVE | Noted: 2025-07-24

## 2025-07-25 ENCOUNTER — APPOINTMENT (OUTPATIENT)
Dept: ELECTROPHYSIOLOGY | Facility: CLINIC | Age: 55
End: 2025-07-25
Payer: COMMERCIAL

## 2025-07-25 VITALS
BODY MASS INDEX: 29.87 KG/M2 | HEIGHT: 77 IN | DIASTOLIC BLOOD PRESSURE: 74 MMHG | WEIGHT: 253 LBS | SYSTOLIC BLOOD PRESSURE: 128 MMHG | OXYGEN SATURATION: 95 % | HEART RATE: 56 BPM

## 2025-07-25 DIAGNOSIS — I48.4 ATYPICAL ATRIAL FLUTTER: ICD-10-CM

## 2025-07-25 DIAGNOSIS — I10 ESSENTIAL (PRIMARY) HYPERTENSION: ICD-10-CM

## 2025-07-25 DIAGNOSIS — I48.0 PAROXYSMAL ATRIAL FIBRILLATION: ICD-10-CM

## 2025-07-25 PROCEDURE — 99214 OFFICE O/P EST MOD 30 MIN: CPT | Mod: 25

## 2025-07-25 PROCEDURE — 93000 ELECTROCARDIOGRAM COMPLETE: CPT

## 2025-07-29 ENCOUNTER — APPOINTMENT (OUTPATIENT)
Dept: CARDIOLOGY | Facility: CLINIC | Age: 55
End: 2025-07-29
Payer: COMMERCIAL

## 2025-07-29 DIAGNOSIS — I35.0 NONRHEUMATIC AORTIC (VALVE) STENOSIS: ICD-10-CM

## 2025-07-29 PROCEDURE — 93306 TTE W/DOPPLER COMPLETE: CPT

## 2025-09-03 ENCOUNTER — OUTPATIENT (OUTPATIENT)
Dept: OUTPATIENT SERVICES | Facility: HOSPITAL | Age: 55
LOS: 1 days | End: 2025-09-03
Payer: COMMERCIAL

## 2025-09-03 ENCOUNTER — TRANSCRIPTION ENCOUNTER (OUTPATIENT)
Age: 55
End: 2025-09-03

## 2025-09-03 VITALS
WEIGHT: 240.08 LBS | HEART RATE: 52 BPM | TEMPERATURE: 98 F | OXYGEN SATURATION: 97 % | HEIGHT: 76 IN | RESPIRATION RATE: 16 BRPM | SYSTOLIC BLOOD PRESSURE: 131 MMHG | DIASTOLIC BLOOD PRESSURE: 86 MMHG

## 2025-09-03 VITALS
DIASTOLIC BLOOD PRESSURE: 82 MMHG | SYSTOLIC BLOOD PRESSURE: 124 MMHG | OXYGEN SATURATION: 97 % | RESPIRATION RATE: 20 BRPM | HEART RATE: 68 BPM

## 2025-09-03 DIAGNOSIS — Z98.890 OTHER SPECIFIED POSTPROCEDURAL STATES: Chronic | ICD-10-CM

## 2025-09-03 DIAGNOSIS — I48.0 PAROXYSMAL ATRIAL FIBRILLATION: ICD-10-CM

## 2025-09-03 LAB
ANION GAP SERPL CALC-SCNC: 12 MMOL/L — SIGNIFICANT CHANGE UP (ref 7–14)
BUN SERPL-MCNC: 20 MG/DL — SIGNIFICANT CHANGE UP (ref 7–23)
CALCIUM SERPL-MCNC: 9.1 MG/DL — SIGNIFICANT CHANGE UP (ref 8.4–10.5)
CHLORIDE SERPL-SCNC: 102 MMOL/L — SIGNIFICANT CHANGE UP (ref 98–107)
CO2 SERPL-SCNC: 24 MMOL/L — SIGNIFICANT CHANGE UP (ref 22–31)
CREAT SERPL-MCNC: 0.85 MG/DL — SIGNIFICANT CHANGE UP (ref 0.5–1.3)
EGFR: 101 ML/MIN/1.73M2 — SIGNIFICANT CHANGE UP
EGFR: 101 ML/MIN/1.73M2 — SIGNIFICANT CHANGE UP
GLUCOSE SERPL-MCNC: 101 MG/DL — HIGH (ref 70–99)
HCT VFR BLD CALC: 51.7 % — HIGH (ref 39–50)
HGB BLD-MCNC: 17.4 G/DL — HIGH (ref 13–17)
MCHC RBC-ENTMCNC: 31.6 PG — SIGNIFICANT CHANGE UP (ref 27–34)
MCHC RBC-ENTMCNC: 33.7 G/DL — SIGNIFICANT CHANGE UP (ref 32–36)
MCV RBC AUTO: 93.8 FL — SIGNIFICANT CHANGE UP (ref 80–100)
NRBC # BLD AUTO: 0 K/UL — SIGNIFICANT CHANGE UP (ref 0–0)
NRBC # FLD: 0 K/UL — SIGNIFICANT CHANGE UP (ref 0–0)
NRBC BLD AUTO-RTO: 0 /100 WBCS — SIGNIFICANT CHANGE UP (ref 0–0)
PLATELET # BLD AUTO: 189 K/UL — SIGNIFICANT CHANGE UP (ref 150–400)
PMV BLD: 11 FL — SIGNIFICANT CHANGE UP (ref 7–13)
POTASSIUM SERPL-MCNC: 4.1 MMOL/L — SIGNIFICANT CHANGE UP (ref 3.5–5.3)
POTASSIUM SERPL-SCNC: 4.1 MMOL/L — SIGNIFICANT CHANGE UP (ref 3.5–5.3)
RBC # BLD: 5.51 M/UL — SIGNIFICANT CHANGE UP (ref 4.2–5.8)
RBC # FLD: 13.1 % — SIGNIFICANT CHANGE UP (ref 10.3–14.5)
RH IG SCN BLD-IMP: POSITIVE — SIGNIFICANT CHANGE UP
SODIUM SERPL-SCNC: 138 MMOL/L — SIGNIFICANT CHANGE UP (ref 135–145)
WBC # BLD: 7.88 K/UL — SIGNIFICANT CHANGE UP (ref 3.8–10.5)
WBC # FLD AUTO: 7.88 K/UL — SIGNIFICANT CHANGE UP (ref 3.8–10.5)

## 2025-09-03 PROCEDURE — 93010 ELECTROCARDIOGRAM REPORT: CPT

## 2025-09-03 RX ORDER — ONDANSETRON HCL/PF 4 MG/2 ML
4 VIAL (ML) INJECTION ONCE
Refills: 0 | Status: DISCONTINUED | OUTPATIENT
Start: 2025-09-03 | End: 2025-09-04

## 2025-09-03 RX ORDER — FUROSEMIDE 10 MG/ML
40 INJECTION INTRAMUSCULAR; INTRAVENOUS ONCE
Refills: 0 | Status: COMPLETED | OUTPATIENT
Start: 2025-09-03 | End: 2025-09-03

## 2025-09-03 RX ORDER — IPRATROPIUM BROMIDE AND ALBUTEROL SULFATE .5; 2.5 MG/3ML; MG/3ML
3 SOLUTION RESPIRATORY (INHALATION) ONCE
Refills: 0 | Status: COMPLETED | OUTPATIENT
Start: 2025-09-03 | End: 2025-09-03

## 2025-09-03 RX ORDER — FENTANYL CITRATE-0.9 % NACL/PF 100MCG/2ML
25 SYRINGE (ML) INTRAVENOUS
Refills: 0 | Status: DISCONTINUED | OUTPATIENT
Start: 2025-09-03 | End: 2025-09-04

## 2025-09-03 RX ADMIN — FUROSEMIDE 40 MILLIGRAM(S): 10 INJECTION INTRAMUSCULAR; INTRAVENOUS at 15:10

## 2025-09-03 RX ADMIN — Medication 3 MILLILITER(S): at 15:15

## 2025-09-03 RX ADMIN — IPRATROPIUM BROMIDE AND ALBUTEROL SULFATE 3 MILLILITER(S): .5; 2.5 SOLUTION RESPIRATORY (INHALATION) at 15:40
